# Patient Record
Sex: MALE | Race: WHITE | NOT HISPANIC OR LATINO | Employment: OTHER | URBAN - METROPOLITAN AREA
[De-identification: names, ages, dates, MRNs, and addresses within clinical notes are randomized per-mention and may not be internally consistent; named-entity substitution may affect disease eponyms.]

---

## 2017-12-05 ENCOUNTER — TELEPHONE (OUTPATIENT)
Dept: NEUROSURGERY | Facility: CLINIC | Age: 72
End: 2017-12-05

## 2017-12-05 NOTE — TELEPHONE ENCOUNTER
----- Message from Olivier Cormier sent at 12/5/2017  3:17 PM CST -----  Contact[facility]  Appointment Request From: Erasmo Fitch    With Provider: Georgi Brennan MD [[facility] - Neurosurgery [facility]]    Would Accept With:Only the person I've selected    Preferred Date Range: From 12/5/2017 To 2/28/2018    Preferred Times: Any    Reason for visit: Request an Appt    Comments:  Pain in Neck

## 2017-12-05 NOTE — TELEPHONE ENCOUNTER
Dr Brennan's last note said nothing surgical to offer, but the patient insists on coming to see him and not a Pa. Scheduled appt 02/06/2018. Patient verbalized acknowledegement

## 2017-12-27 ENCOUNTER — OFFICE VISIT (OUTPATIENT)
Dept: NEUROLOGY | Facility: CLINIC | Age: 72
End: 2017-12-27
Payer: MEDICARE

## 2017-12-27 VITALS
DIASTOLIC BLOOD PRESSURE: 82 MMHG | HEIGHT: 70 IN | SYSTOLIC BLOOD PRESSURE: 133 MMHG | BODY MASS INDEX: 25.69 KG/M2 | HEART RATE: 60 BPM | WEIGHT: 179.44 LBS

## 2017-12-27 DIAGNOSIS — M48.9 CERVICAL SPINE DISEASE: ICD-10-CM

## 2017-12-27 DIAGNOSIS — M54.2 NECK PAIN: Primary | ICD-10-CM

## 2017-12-27 DIAGNOSIS — M47.12 CERVICAL SPONDYLOSIS WITH MYELOPATHY: ICD-10-CM

## 2017-12-27 PROCEDURE — 99214 OFFICE O/P EST MOD 30 MIN: CPT | Mod: S$GLB,,, | Performed by: PSYCHIATRY & NEUROLOGY

## 2017-12-27 PROCEDURE — 99999 PR PBB SHADOW E&M-EST. PATIENT-LVL III: CPT | Mod: PBBFAC,,, | Performed by: PSYCHIATRY & NEUROLOGY

## 2017-12-27 RX ORDER — TRIAMTERENE AND HYDROCHLOROTHIAZIDE 37.5; 25 MG/1; MG/1
CAPSULE ORAL
COMMUNITY
Start: 2017-09-19 | End: 2020-06-05

## 2017-12-27 RX ORDER — TRIAMCINOLONE ACETONIDE 1 MG/G
CREAM TOPICAL
COMMUNITY
Start: 2017-12-15 | End: 2019-03-14

## 2017-12-27 NOTE — PROGRESS NOTES
Subjective:       Patient ID: Erasmo Fitch is a 72 y.o. male.    Chief Complaint:  Neck Pain      History of Present Illness  HPI   This is a 72-year-old male who returns in followup.  He has had a history of chronic neck problems and subsequently underwent a cervical fusion done by Dr. Guerra, [facility] neurosurgery, at [facility] earlier this year.  He has subsequently done well though continues to have neck pain with associated muscle tightness radiating to the shoulders and between the shoulder blade.  He denies any weakness.  He also had some back problems with occasional tingling sensation to the hip on the left.  He denies any history of recent trauma.  He does plan to see neurosurgery at [facility] in about a month.  He has been otherwise stable and is presently in pain management for the neck pain.    Review of Systems  Review of Systems   Constitutional: Negative.    HENT: Negative for hearing loss.    Eyes: Negative.  Negative for visual disturbance.   Respiratory: Negative.  Negative for shortness of breath.    Cardiovascular: Negative for chest pain and palpitations.   Gastrointestinal: Negative.    Genitourinary: Negative.    Musculoskeletal: Positive for back pain (occasional back pain related to osteoarthritis and history of recent fall) and neck pain. Negative for gait problem.   Skin: Negative.    Neurological: Negative for tremors, seizures, syncope, speech difficulty, weakness and headaches.   Psychiatric/Behavioral: Negative.        Objective:      Neurologic Exam    Physical Exam   Constitutional: He appears well-developed and well-nourished.   HENT:   Head: Normocephalic and atraumatic.   Right Ear: Hearing normal.   Left Ear: Hearing normal.   Eyes:   Fundus examination shows sharp disc margins.   Neck: Normal range of motion. Neck supple. Muscular tenderness (Mid and upper cervical paraspinal muscle tenderness) present. Carotid bruit is not present.   Neurological: He is alert. He  displays abnormal reflex (DTRs are brisk though diminished at the ankles.  Plantars are flexor). He displays no atrophy. No cranial nerve deficit (Visual fields at bedside testing essentially normal.  No facial asymmetry noted with facial movements and sensory exam being normal/symmetrical.  Corneals/gag reflexes normal.  Tongue & palate movements normal.  Hearing unimpaired.  Shoulder shrug was norm) or sensory deficit. He exhibits normal muscle tone. He displays a negative Romberg sign. Coordination and gait normal.   Mental status examination: Patient is fully oriented and able to give an adequate history.  Recall of recent and past information is good.  Immediate recall is normal.  Attention span and concentration was normal.  Judgment and insight is normal.  Language functions are intact with no evidence of aphasia or dysarthria.  Comprehension is unimpaired.  Affect is appropriate, mood was even.  No thought disorder is noted.   Vitals reviewed.        Assessment:        1. Neck pain    2. Cervical spine disease    3. Cervical spondylosis with myelopathy             Plan:       Discussed with patient and spouse.  He is advised to continue follow-up with pain management and keep his appointment with [facility] neurosurgery.  Would expect his symptoms would gradually improve over time with treatment at the pain management Center.  He will follow-up on an as-needed basis.

## 2017-12-27 NOTE — PATIENT INSTRUCTIONS
Discussed with patient and spouse.  He is advised to continue follow-up with pain management and keep his appointment with [facility] neurosurgery.  Would expect his symptoms would gradually improve over time with treatment at the pain management Center.  He will follow-up on an as-needed basis.

## 2018-01-20 ENCOUNTER — OFFICE VISIT (OUTPATIENT)
Dept: URGENT CARE | Facility: CLINIC | Age: 73
End: 2018-01-20
Payer: MEDICARE

## 2018-01-20 VITALS
OXYGEN SATURATION: 100 % | BODY MASS INDEX: 25.62 KG/M2 | HEIGHT: 70 IN | WEIGHT: 179 LBS | RESPIRATION RATE: 16 BRPM | HEART RATE: 64 BPM | SYSTOLIC BLOOD PRESSURE: 162 MMHG | DIASTOLIC BLOOD PRESSURE: 90 MMHG | TEMPERATURE: 97 F

## 2018-01-20 DIAGNOSIS — N41.0 ACUTE PROSTATITIS: Primary | ICD-10-CM

## 2018-01-20 LAB
BILIRUB UR QL STRIP: NEGATIVE
GLUCOSE UR QL STRIP: NEGATIVE
KETONES UR QL STRIP: POSITIVE
LEUKOCYTE ESTERASE UR QL STRIP: NEGATIVE
PH, POC UA: 5 (ref 5–8)
POC BLOOD, URINE: NEGATIVE
POC NITRATES, URINE: NEGATIVE
PROT UR QL STRIP: NEGATIVE
SP GR UR STRIP: 1.02 (ref 1–1.03)
UROBILINOGEN UR STRIP-ACNC: NORMAL (ref 0.3–2.2)

## 2018-01-20 PROCEDURE — 81003 URINALYSIS AUTO W/O SCOPE: CPT | Mod: QW,S$GLB,, | Performed by: NURSE PRACTITIONER

## 2018-01-20 PROCEDURE — 99203 OFFICE O/P NEW LOW 30 MIN: CPT | Mod: 25,S$GLB,, | Performed by: NURSE PRACTITIONER

## 2018-01-20 RX ORDER — SULFAMETHOXAZOLE AND TRIMETHOPRIM 800; 160 MG/1; MG/1
1 TABLET ORAL 2 TIMES DAILY
Qty: 28 TABLET | Refills: 0 | Status: SHIPPED | OUTPATIENT
Start: 2018-01-20 | End: 2018-02-03

## 2018-01-20 NOTE — PROGRESS NOTES
"Subjective:       Patient ID: Erasmo Fitch is a 72 y.o. male.    Vitals:  height is 5' 10" (1.778 m) and weight is 81.2 kg (179 lb). His temperature is 97.2 °F (36.2 °C). His blood pressure is 162/90 (abnormal) and his pulse is 64. His respiration is 16 and oxygen saturation is 100%.     Chief Complaint: Urinary Tract Infection    Pt states he has been having lower back aching and smelly urine. Pt states the symptoms started 2 days ago. Pt has history of uti's and a partial TURP about 3 years ago.  Pt reports urinary hesitancy, frequency, and concentrated urine.      Urinary Tract Infection    This is a new problem. The current episode started in the past 7 days. The problem has been unchanged. The patient is experiencing no pain. There has been no fever. Pertinent negatives include no chills, hematuria, nausea, urgency, vomiting or rash. He has tried nothing for the symptoms. His past medical history is significant for recurrent UTIs.     Review of Systems   Constitution: Negative for chills and fever.   Eyes: Negative for discharge.   Skin: Negative for flushing and rash.   Musculoskeletal: Negative for back pain.   Gastrointestinal: Negative for nausea and vomiting.   Genitourinary: Negative for dysuria, genital sores, hematuria and urgency.       Objective:      Physical Exam   Constitutional: He is oriented to person, place, and time. Vital signs are normal. He appears well-developed and well-nourished. He is cooperative.  Non-toxic appearance. He does not have a sickly appearance. He does not appear ill. No distress.   HENT:   Head: Normocephalic and atraumatic.   Right Ear: External ear normal.   Left Ear: External ear normal.   Nose: Nose normal.   Eyes: Conjunctivae and lids are normal.   Cardiovascular: Normal rate, regular rhythm and normal heart sounds.    Pulmonary/Chest: Effort normal and breath sounds normal. No respiratory distress. He has no decreased breath sounds. He has no wheezes. He has no " rhonchi. He has no rales. He exhibits no tenderness.   Abdominal: Normal appearance and bowel sounds are normal. He exhibits no distension, no pulsatile liver, no fluid wave, no ascites, no pulsatile midline mass and no mass. There is no hepatosplenomegaly, splenomegaly or hepatomegaly. There is no tenderness. There is no rigidity, no rebound, no guarding, no CVA tenderness, no tenderness at McBurney's point and negative Pimentel's sign.   Musculoskeletal:        Back:    Pt reports aching in lower back bilaterally.   Neurological: He is alert and oriented to person, place, and time.   Skin: Skin is warm. He is not diaphoretic.   Psychiatric: He has a normal mood and affect. His behavior is normal.   Nursing note and vitals reviewed.      Assessment:       1. Acute prostatitis        Plan:         Acute prostatitis  -     POCT Urinalysis, Dipstick, Automated, W/O Scope  -     sulfamethoxazole-trimethoprim 800-160mg (BACTRIM DS) 800-160 mg Tab; Take 1 tablet by mouth 2 (two) times daily.  Dispense: 28 tablet; Refill: 0  -     Urine culture    urine culture came back positive for enterococcus faecalis.  Not sensitive to bactrim.  Will discontinue bactrim ds and start augmentin 875mg bid.  Zeferino Johnson Md.  Pt will follow up with urologist.  Pt has an established urologist, however he has been unable to make an appointment due to offices being closed from weather conditions.  Instructed pt to follow up with his urologist as soon as possible.  Discussed urine culture with pt and that clinic will give him a call in the next week with results.

## 2018-01-20 NOTE — PATIENT INSTRUCTIONS
Please follow up with your urologist in the next 1-2 weeks.    Please go to the Emergency Department for any concerns or worsening of condition or if you are unable to urinate.    If you were prescribed antibiotics, please take them to completion.    Please follow up with your primary care doctor or specialist as needed.    Please drink plenty of fluids.  Please get plenty of rest.    Please follow up with your primary care doctor or specialist as needed.    If you  smoke, please stop smoking.    Prostatitis    The prostate gland is located deep inside the body at the base of the bladder. Prostatitis is an inflammation of the prostate gland. This can occur with or without infection. Most cases of prostatitis are long term (chronic). Most do not involve a bacterial infection.  · Chronic prostatitis is more common in older men. It is usually an inflammatory condition and not an infection. But, bacterial infection can also cause chronic prostatitis. It can cause pain in the rectum, urethra, bladder, or scrotum. It can also make you unable to fully empty the bladder.  You may urinate often, or have burning with urination. Prostatitis may also cause painful ejaculation and erectile dysfunction.  · Sudden onset (acute) prostatitis usually occurs in men younger than 35. It is from a bacterial infection. You may have severe symptoms such as fever, chills, muscle aches, and pain in the area between the scrotum and anus (perineum). You may have a hard time urinating, or have pain or burning when urinating. There may be blood or pus in the urine.  Your healthcare provider may do a culture test on prostate fluids or discharge from the penis. This will help determine if bacteria are the cause. Treatment can include antibiotics, anti-inflammatory medicine, prostate medicines, and stool softeners.  Home care  These guidelines will help you care for yourself at home:  · Rest at home until the fever is gone and you are feeling  better.  · A hot sitz bath may offer some relief. Fill a tub with 6 inches of hot water. Allow the water to run so you can keep it hot for 10 to 15 minutes.  · Drink plenty of fluids. Do not drink alcohol or caffeine until all symptoms are gone.  · If your healthcare gives you an antibiotic, take it exactly as you are told. Take it until it is all gone.  · Constipation causes straining and pain. Avoid constipation by eating natural laxatives such as prunes, fresh fruits, and whole-grain cereals. If needed, use a mild over-the-counter (OTC) laxative for constipation. An OTC stool softener may be used to keep the stools soft.  · If sex is uncomfortable or painful, avoid until symptoms get better.  · You may use OTC medicines for pain and fever, unless another medicine was given. If you have chronic liver or kidney disease, talk with your healthcare provider before using these medicines. Also talk with your provider if you've ever had a stomach ulcer or GI bleeding.  Follow-up care  Follow up with your healthcare provider, a urologist, or as advised to be sure you are responding to treatment. Your healthcare provider may want to see you after you finish your antibiotics to be sure the infection has cleared. If a culture was taken, you may call for the results as directed. A culture test can help your healthcare provider know if you are on the correct antibiotic.  Call 911  Call 911 if any of these occur:  · Weakness, dizziness, or fainting  When to seek medical advice  Call your healthcare provider right away if any of these occur:  · Fever of 100.4°F (38°C) or higher after 3 days of treatment, or as advised  · Unable to pass urine for 8 hours  · Pressure or pain in your bladder gets worse  · Painful swelling of the testicle or scrotum  Date Last Reviewed: 10/1/2016  © 8803-4833 [employer]. [address] [place] [address], [place] [address]. All rights reserved. This information is not intended as a substitute  for professional medical care. Always follow your healthcare professional's instructions.

## 2018-01-26 LAB
BACTERIA UR CULT: ABNORMAL
BACTERIA UR CULT: ABNORMAL
OTHER ANTIBIOTIC SUSC ISLT: ABNORMAL

## 2018-01-26 RX ORDER — AMOXICILLIN AND CLAVULANATE POTASSIUM 875; 125 MG/1; MG/1
1 TABLET, FILM COATED ORAL 2 TIMES DAILY
Qty: 20 TABLET | Refills: 0 | Status: SHIPPED | OUTPATIENT
Start: 2018-01-26 | End: 2018-02-05

## 2018-02-06 ENCOUNTER — OFFICE VISIT (OUTPATIENT)
Dept: NEUROSURGERY | Facility: CLINIC | Age: 73
End: 2018-02-06
Payer: MEDICARE

## 2018-02-06 VITALS
HEART RATE: 60 BPM | HEIGHT: 70 IN | WEIGHT: 179 LBS | DIASTOLIC BLOOD PRESSURE: 85 MMHG | SYSTOLIC BLOOD PRESSURE: 133 MMHG | TEMPERATURE: 98 F | BODY MASS INDEX: 25.62 KG/M2

## 2018-02-06 DIAGNOSIS — Z98.1 S/P CERVICAL SPINAL FUSION: Primary | ICD-10-CM

## 2018-02-06 PROCEDURE — 99999 PR PBB SHADOW E&M-EST. PATIENT-LVL III: CPT | Mod: PBBFAC,,, | Performed by: NEUROLOGICAL SURGERY

## 2018-02-06 PROCEDURE — 1159F MED LIST DOCD IN RCRD: CPT | Mod: S$GLB,,, | Performed by: NEUROLOGICAL SURGERY

## 2018-02-06 PROCEDURE — 99214 OFFICE O/P EST MOD 30 MIN: CPT | Mod: S$GLB,,, | Performed by: NEUROLOGICAL SURGERY

## 2018-02-06 PROCEDURE — 1126F AMNT PAIN NOTED NONE PRSNT: CPT | Mod: S$GLB,,, | Performed by: NEUROLOGICAL SURGERY

## 2018-02-06 PROCEDURE — 3008F BODY MASS INDEX DOCD: CPT | Mod: S$GLB,,, | Performed by: NEUROLOGICAL SURGERY

## 2018-02-06 NOTE — PROGRESS NOTES
Subjective:    I, Candace Moody, attest that this documentation has been prepared under the direction and in the presence of TORSTEN Brennan MD.     Patient ID: Erasmo Fitch is a 72 y.o. male.    Chief Complaint: Follow-up    HPI   Pt is a 72 y.o. male who presents with history of C4-5, C5-6, C6-7 fusion. Currently seeing Dr. Thomas. Now back to see us. Pt has not had any new scans since 2015. Pt states that he has had a C4-7 fusion 10/5/2016 by Dr. Lundberg at [facility]. Pt has not been followed by Dr. Lundberg. Postoperatively pt has had significant relief with increased mobility. Pt presents today to assess the fusion. Pt has received bone stimulator.     Review of Systems   Constitutional: Negative for chills, diaphoresis, fatigue and fever.   HENT: Negative for congestion, rhinorrhea, sinus pressure, sneezing, sore throat and trouble swallowing.    Eyes: Negative.  Negative for visual disturbance.   Respiratory: Negative for cough, choking, chest tightness and shortness of breath.    Cardiovascular: Negative for chest pain.   Gastrointestinal: Negative for abdominal pain, diarrhea, nausea and vomiting.   Endocrine: Negative.    Genitourinary: Negative for dysuria.   Skin: Negative for color change, pallor, rash and wound.   Neurological: Negative for syncope.   Hematological: Does not bruise/bleed easily.   Psychiatric/Behavioral: Negative for confusion.       Objective:      Physical Exam:  Nursing note and vitals reviewed.    Constitutional: He appears well-developed.     Eyes: Pupils are equal, round, and reactive to light. Conjunctivae and EOM are normal.     Cardiovascular: Normal rate, regular rhythm, normal pulses and intact distal pulses.     Abdominal: Soft.     Psych/Behavior: He is alert. He is oriented to person, place, and time. He has a normal mood and affect.     Musculoskeletal: Gait is normal.        Neck: Range of motion is full. There is no tenderness. Muscle strength is 5/5. Tone is normal.        Back:  Range of motion is full. There is no tenderness. Muscle strength is 5/5. Tone is normal.        Right Upper Extremities: Range of motion is full. There is no tenderness. Muscle strength is 5/5. Tone is normal.        Left Upper Extremities: Range of motion is full. There is no tenderness. Muscle strength is 5/5. Tone is normal.       Right Lower Extremities: Range of motion is full. There is no tenderness. Muscle strength is 5/5. Tone is normal.        Left Lower Extremities: Range of motion is full. There is no tenderness. Muscle strength is 5/5. Tone is normal.     Neurological:        Coordination: He has a normal Romberg Test, normal finger to nose coordination, normal heel to shin coordination and normal tandem walking coordination.        DTRs: DTRs are normal. Tricep reflexes are 2+ on the right side and 2+ on the left side. Bicep reflexes are 2+ on the right side and 2+ on the left side. Brachioradialis reflexes are 2+ on the right side and 2+ on the left side. Patellar reflexes are 2+ on the right side and 2+ on the left side. Achilles reflexes are 2+ on the right side and 2+ on the left side.        Cranial nerves: Cranial nerve(s) II, III, IV, V, VI, VII, VIII, IX, X, XI and XII are intact.        Pt's wound is well healed and full strength and no signs of radiculopathy or myelopathy.    Imaging:   MRI C spine, dated 3/22/3017, shows multiple level disc disease at C4-7     Latest CT of c-spine shows his ACDF construct looks good and he is fused solid at C4-C5 and C6-C7 and C5-6 is starting to fuse    ITORSTEN MD, personally reviewed the imaging and interpreted independent of the radiology report.    Assessment/Plan:   Pt with with 3 level ACDF done at [facility].  Still has neck pain but radiculopathy better. His imaging shows that he is fused and there is nothing else I have to offer from a neurosurgical prospective. Spent a fair amount of time going over his scans and explaining things to him and his  wife.  F/U PRN    .     I, TORSETN Brennan MD, personally performed the services described in this documentation. All medical record entries made by the scribe, Candace Moody, were at my direction and in my presence.  I have reviewed the chart and agree that the record reflects my personal performance and is accurate and complete.

## 2018-02-14 ENCOUNTER — OFFICE VISIT (OUTPATIENT)
Dept: URGENT CARE | Facility: CLINIC | Age: 73
End: 2018-02-14
Payer: MEDICARE

## 2018-02-14 VITALS
BODY MASS INDEX: 25.05 KG/M2 | WEIGHT: 175 LBS | HEART RATE: 58 BPM | TEMPERATURE: 97 F | SYSTOLIC BLOOD PRESSURE: 142 MMHG | OXYGEN SATURATION: 98 % | HEIGHT: 70 IN | RESPIRATION RATE: 18 BRPM | DIASTOLIC BLOOD PRESSURE: 91 MMHG

## 2018-02-14 DIAGNOSIS — M54.9 BACK PAIN, UNSPECIFIED BACK LOCATION, UNSPECIFIED BACK PAIN LATERALITY, UNSPECIFIED CHRONICITY: Primary | ICD-10-CM

## 2018-02-14 LAB
BILIRUB UR QL STRIP: NEGATIVE
GLUCOSE UR QL STRIP: NEGATIVE
KETONES UR QL STRIP: POSITIVE
LEUKOCYTE ESTERASE UR QL STRIP: NEGATIVE
PH, POC UA: 5.5 (ref 5–8)
POC BLOOD, URINE: NEGATIVE
POC NITRATES, URINE: NEGATIVE
PROT UR QL STRIP: NEGATIVE
SP GR UR STRIP: 1.02 (ref 1–1.03)
UROBILINOGEN UR STRIP-ACNC: ABNORMAL (ref 0.3–2.2)

## 2018-02-14 PROCEDURE — 1159F MED LIST DOCD IN RCRD: CPT | Mod: S$GLB,,, | Performed by: NURSE PRACTITIONER

## 2018-02-14 PROCEDURE — 81003 URINALYSIS AUTO W/O SCOPE: CPT | Mod: QW,S$GLB,, | Performed by: NURSE PRACTITIONER

## 2018-02-14 PROCEDURE — 99213 OFFICE O/P EST LOW 20 MIN: CPT | Mod: 25,S$GLB,, | Performed by: NURSE PRACTITIONER

## 2018-02-14 PROCEDURE — 3008F BODY MASS INDEX DOCD: CPT | Mod: S$GLB,,, | Performed by: NURSE PRACTITIONER

## 2018-02-14 NOTE — PROGRESS NOTES
"Subjective:       Patient ID: Erasmo Fitch is a 72 y.o. male.    Vitals:  height is 5' 10" (1.778 m) and weight is 79.4 kg (175 lb). His temperature is 97.1 °F (36.2 °C). His blood pressure is 142/91 (abnormal) and his pulse is 58 (abnormal). His respiration is 18 and oxygen saturation is 98%.     Chief Complaint: Urinary Tract Infection (Follow up of Prostatitis that was given medication for on the 1-)    Pt seen here last month for UTI and was treated with bactrim and augmentin once culture came back; he said he is here today just to make sure he no longer has "bacteria in my urine"; states he does have slight lower right back pain      Urinary Tract Infection    This is a recurrent problem. The current episode started 1 to 4 weeks ago. The problem has been gradually improving. Pertinent negatives include no chills, hematuria, nausea, urgency, vomiting or rash. The treatment provided significant relief.     Review of Systems   Constitution: Negative for chills and fever.   Eyes: Negative for discharge.   Skin: Negative for flushing and rash.   Musculoskeletal: Negative for back pain.   Gastrointestinal: Negative for nausea and vomiting.   Genitourinary: Negative for dysuria, genital sores, hematuria and urgency.       Objective:      Physical Exam   Constitutional: He is oriented to person, place, and time. He appears well-developed and well-nourished. He is cooperative.  Non-toxic appearance. He does not appear ill. No distress.   HENT:   Head: Normocephalic and atraumatic.   Right Ear: Hearing, tympanic membrane and ear canal normal.   Left Ear: Hearing, tympanic membrane and ear canal normal.   Nose: No mucosal edema, rhinorrhea or nasal deformity. No epistaxis. Right sinus exhibits no maxillary sinus tenderness and no frontal sinus tenderness. Left sinus exhibits no maxillary sinus tenderness and no frontal sinus tenderness.   Mouth/Throat: Uvula is midline and mucous membranes are normal. No trismus " "in the jaw. Normal dentition. No uvula swelling. No posterior oropharyngeal erythema.   Eyes: Conjunctivae and lids are normal. Right eye exhibits no discharge. Left eye exhibits no discharge. No scleral icterus.   Sclera clear bilat   Neck: Trachea normal, normal range of motion, full passive range of motion without pain and phonation normal. Neck supple.   Cardiovascular: Normal rate, regular rhythm and normal pulses.    Pulmonary/Chest: Effort normal. No respiratory distress.   Abdominal: Soft. Normal appearance. He exhibits no distension, no pulsatile midline mass and no mass. There is no tenderness.   Musculoskeletal: Normal range of motion. He exhibits no edema or deformity.   Neurological: He is alert and oriented to person, place, and time. He exhibits normal muscle tone. Coordination normal.   Skin: Skin is warm, dry and intact. He is not diaphoretic. No pallor.   Psychiatric: He has a normal mood and affect. His speech is normal and behavior is normal. Judgment and thought content normal. Cognition and memory are normal.   Nursing note and vitals reviewed.        Urine negative today for leukocytes however pt requesting urine culture "just to be sure"  Assessment:       1. Back pain, unspecified back location, unspecified back pain laterality, unspecified chronicity        Plan:       Patient Instructions     Urinary Tract Infections in Men    Urinary tract infections (UTIs) are most often caused by bacteria that invade the urinary tract. The bacteria may come from outside the body. Or they may travel from the skin outside of rectum into the urethra. Pain in or around the urinary tract is a common symptom for most UTIs. But the only way to know for sure if you have a UTI is to have a urinalysis and urine culture.   Types of UTIs  · Cystitis: This is a bladder infection and is often linked to a blockage from an enlarged prostate. You may have an urgent or frequent need to urinate, and bloody urine. " Treatment includes antibiotics and medicine to relax or shrink the prostate. Sometimes, surgery is needed.  · Urethritis: This is an infection of the urethra. You may have a discharge from the urethra or burning when you urinate. You may also have pain in the urethra or penis. It is treated with antibiotics.  · Prostatitis: This is an inflammation or infection of the prostate. You may have an urgent or frequent need to urinate, fever, or burning when you urinate. Or you may have a tender prostate, or a vague feeling of pressure. Prostatitis is treated with a range of medicines, depending on the cause.  · Pyelonephritis: This is a kidney infection. If not treated, it can be serious and damage your kidneys. In severe cases you may be hospitalized. You may have a fever and upper back pain.  Treating a UTI  · Medicine: Most UTIs are treated with antibiotics. These kill the bacteria. The length of time you need to take them depends on the type of infection. Take antibiotics exactly as directed until all of the medicine is gone. If you don't, the infection may not go away and may become harder to treat. For certain types of UTIs, you may be given other medicine to help treat your symptoms.  · Lifestyle changes: The lifestyle changes below will help get rid of your current infection. They may also help prevent future UTIs.  ¨ Drink plenty of fluids such as water, juice, or other caffeine-free drinks. This helps flush bacteria out of your system.  ¨ Empty your bladder when you feel the urge to urinate and before going to sleep. Urine that stays in your bladder promotes infection.  ¨ Use condoms during sex. These help prevent UTIs caused by sexually transmitted bacteria.  ¨ Keep follow-up appointments with your healthcare provider. He or she can may do tests to make sure the infection has cleared. If needed, more treatment can be started.  · Other treatment: Most UTIs respond to medicine. But sometimes a procedure or surgery  is needed. This can treat an enlarged prostate, or remove a kidney stone or other blockage. Surgery may also treat problems caused by scarring or long-term infections.  Date Last Reviewed: 1/1/2017  © 4471-7171 [employer]. [address] [place] [address], [place] [address]. All rights reserved. This information is not intended as a substitute for professional medical care. Always follow your healthcare professional's instructions.              Back pain, unspecified back location, unspecified back pain laterality, unspecified chronicity  -     POCT Urinalysis, Dipstick, Automated, W/O Scope  -     Urine culture

## 2018-02-14 NOTE — PATIENT INSTRUCTIONS
Urinary Tract Infections in Men    Urinary tract infections (UTIs) are most often caused by bacteria that invade the urinary tract. The bacteria may come from outside the body. Or they may travel from the skin outside of rectum into the urethra. Pain in or around the urinary tract is a common symptom for most UTIs. But the only way to know for sure if you have a UTI is to have a urinalysis and urine culture.   Types of UTIs  · Cystitis: This is a bladder infection and is often linked to a blockage from an enlarged prostate. You may have an urgent or frequent need to urinate, and bloody urine. Treatment includes antibiotics and medicine to relax or shrink the prostate. Sometimes, surgery is needed.  · Urethritis: This is an infection of the urethra. You may have a discharge from the urethra or burning when you urinate. You may also have pain in the urethra or penis. It is treated with antibiotics.  · Prostatitis: This is an inflammation or infection of the prostate. You may have an urgent or frequent need to urinate, fever, or burning when you urinate. Or you may have a tender prostate, or a vague feeling of pressure. Prostatitis is treated with a range of medicines, depending on the cause.  · Pyelonephritis: This is a kidney infection. If not treated, it can be serious and damage your kidneys. In severe cases you may be hospitalized. You may have a fever and upper back pain.  Treating a UTI  · Medicine: Most UTIs are treated with antibiotics. These kill the bacteria. The length of time you need to take them depends on the type of infection. Take antibiotics exactly as directed until all of the medicine is gone. If you don't, the infection may not go away and may become harder to treat. For certain types of UTIs, you may be given other medicine to help treat your symptoms.  · Lifestyle changes: The lifestyle changes below will help get rid of your current infection. They may also help prevent future UTIs.  ¨ Drink  plenty of fluids such as water, juice, or other caffeine-free drinks. This helps flush bacteria out of your system.  ¨ Empty your bladder when you feel the urge to urinate and before going to sleep. Urine that stays in your bladder promotes infection.  ¨ Use condoms during sex. These help prevent UTIs caused by sexually transmitted bacteria.  ¨ Keep follow-up appointments with your healthcare provider. He or she can may do tests to make sure the infection has cleared. If needed, more treatment can be started.  · Other treatment: Most UTIs respond to medicine. But sometimes a procedure or surgery is needed. This can treat an enlarged prostate, or remove a kidney stone or other blockage. Surgery may also treat problems caused by scarring or long-term infections.  Date Last Reviewed: 1/1/2017  © 7144-2714 [employer]. [address] [place] [address], [place] [address]. All rights reserved. This information is not intended as a substitute for professional medical care. Always follow your healthcare professional's instructions.

## 2018-02-16 LAB
BACTERIA UR CULT: NO GROWTH
BACTERIA UR CULT: NORMAL

## 2018-03-05 ENCOUNTER — OFFICE VISIT (OUTPATIENT)
Dept: URGENT CARE | Facility: CLINIC | Age: 73
End: 2018-03-05
Payer: MEDICARE

## 2018-03-05 VITALS
BODY MASS INDEX: 25.05 KG/M2 | HEIGHT: 70 IN | RESPIRATION RATE: 19 BRPM | DIASTOLIC BLOOD PRESSURE: 93 MMHG | OXYGEN SATURATION: 99 % | WEIGHT: 175 LBS | TEMPERATURE: 97 F | HEART RATE: 71 BPM | SYSTOLIC BLOOD PRESSURE: 163 MMHG

## 2018-03-05 DIAGNOSIS — R30.0 DYSURIA: Primary | ICD-10-CM

## 2018-03-05 DIAGNOSIS — R35.0 URINARY FREQUENCY: ICD-10-CM

## 2018-03-05 LAB
BILIRUB UR QL STRIP: NEGATIVE
GLUCOSE UR QL STRIP: NEGATIVE
KETONES UR QL STRIP: POSITIVE
LEUKOCYTE ESTERASE UR QL STRIP: NEGATIVE
PH, POC UA: 5.5 (ref 5–8)
POC BLOOD, URINE: NEGATIVE
POC NITRATES, URINE: NEGATIVE
PROT UR QL STRIP: POSITIVE
SP GR UR STRIP: 1.02 (ref 1–1.03)
UROBILINOGEN UR STRIP-ACNC: ABNORMAL (ref 0.3–2.2)

## 2018-03-05 PROCEDURE — 81003 URINALYSIS AUTO W/O SCOPE: CPT | Mod: QW,S$GLB,, | Performed by: FAMILY MEDICINE

## 2018-03-05 PROCEDURE — 96372 THER/PROPH/DIAG INJ SC/IM: CPT | Mod: S$GLB,,, | Performed by: FAMILY MEDICINE

## 2018-03-05 PROCEDURE — 99214 OFFICE O/P EST MOD 30 MIN: CPT | Mod: 25,S$GLB,, | Performed by: FAMILY MEDICINE

## 2018-03-05 PROCEDURE — 3077F SYST BP >= 140 MM HG: CPT | Mod: S$GLB,,, | Performed by: FAMILY MEDICINE

## 2018-03-05 PROCEDURE — 3080F DIAST BP >= 90 MM HG: CPT | Mod: S$GLB,,, | Performed by: FAMILY MEDICINE

## 2018-03-05 RX ORDER — NITROFURANTOIN 25; 75 MG/1; MG/1
100 CAPSULE ORAL 2 TIMES DAILY
Qty: 28 CAPSULE | Refills: 0 | Status: SHIPPED | OUTPATIENT
Start: 2018-03-05 | End: 2018-03-19

## 2018-03-05 RX ORDER — CEFTRIAXONE 1 G/1
1 INJECTION, POWDER, FOR SOLUTION INTRAMUSCULAR; INTRAVENOUS
Status: COMPLETED | OUTPATIENT
Start: 2018-03-05 | End: 2018-03-05

## 2018-03-05 RX ADMIN — CEFTRIAXONE 1 G: 1 INJECTION, POWDER, FOR SOLUTION INTRAMUSCULAR; INTRAVENOUS at 11:03

## 2018-03-05 NOTE — PATIENT INSTRUCTIONS
"  Dysuria     Painful urination (dysuria) is often caused by a problem in the urinary tract.   Dysuria is pain felt during urination. It is often described as a burning. Learn more about this problem and how it can be treated.  What causes dysuria?  Possible causes include:  · Infection with a bacteria or virus such as a urinary tract infection (UTI or a sexually transmitted infection (STI)  · Sensitivity or allergy to chemicals such as those found in lotions and other products  · Prostate or bladder problems  · Radiation therapy to the pelvic area  How is dysuria diagnosed?  Your healthcare provider will examine you. He or she will ask about your symptoms and health. After talking with you and doing a physical exam, your healthcare provider may know what is causing your dysuria. He or she will usually request  a sample of your urine. Tests of your urine, or a "urinalysis," are done. A urinalysis may include:  · Looking at the urine sample (visual exam)  · Checking for substances (chemical exam)  · Looking at a small amount under a microscope (microscopic exam)  Some parts of the urinalysis may be done in the provider's office and some in a lab. And, the urine sample may be checked for bacteria and yeast (urine culture). Your healthcare provider will tell you more about these tests if they are needed.  How is dysuria treated?  Treatment depends on the cause. If you have a bacterial infection, you may need antibiotics. You may be given medicines to make it easier for you to urinate and help relieve pain. Your healthcare provider can tell you more about your treatment options. Untreated, symptoms may get worse.  When to call your healthcare provider  Call the healthcare provider right away if you have any of the following:  · Fever of 100.4°F (38°C) or higher   · No improvement after three days of treatment  · Trouble urinating because of pain  · New or increased discharge from the vagina or penis  · Rash or joint " pain  · Increased back or abdominal pain  · Enlarged painful lymph nodes (lumps) in the groin   Date Last Reviewed: 1/1/2017  © 6964-2290 [employer], "[employer]". [address] [place] [address], [place] [address]. All rights reserved. This information is not intended as a substitute for professional medical care. Always follow your healthcare professional's instructions.      Most likely prostatitis.  follow up with urologist for further evaluation and treatment.    Return to the urgent care or go to the ER if symptoms get worse.    Zeferino Johnson MD

## 2018-03-05 NOTE — PROGRESS NOTES
"Subjective:       Patient ID: Erasmo Fitch is a 72 y.o. male.    Vitals:  height is 5' 10" (1.778 m) and weight is 79.4 kg (175 lb). His oral temperature is 96.8 °F (36 °C). His blood pressure is 163/93 (abnormal) and his pulse is 71. His respiration is 19 and oxygen saturation is 99%.     Chief Complaint: Urinary Tract Infection    Urinary Tract Infection    This is a new problem. The current episode started 1 to 4 weeks ago (2weeks). The problem occurs every urination. The problem has been unchanged. The quality of the pain is described as aching and burning. The pain is at a severity of 7/10. The pain is moderate. There has been no fever. The fever has been present for less than 1 day. He is not sexually active. There is no history of pyelonephritis. Associated symptoms include urgency. Pertinent negatives include no chills, hematuria, nausea, vomiting or rash. He has tried acetaminophen for the symptoms. The treatment provided no relief.     Review of Systems   Constitution: Negative for chills and fever.   Eyes: Negative for discharge.   Skin: Negative for flushing and rash.   Musculoskeletal: Positive for back pain.   Gastrointestinal: Negative for nausea and vomiting.   Genitourinary: Positive for dysuria and urgency. Negative for genital sores and hematuria.       Objective:      Physical Exam   Constitutional: He is oriented to person, place, and time. He appears well-developed and well-nourished.   HENT:   Head: Normocephalic and atraumatic.   Eyes: EOM are normal. Pupils are equal, round, and reactive to light.   Neck: Normal range of motion. Neck supple. No JVD present. No tracheal deviation present. No thyromegaly present.   Cardiovascular: Normal rate, regular rhythm and normal heart sounds.  Exam reveals no gallop and no friction rub.    No murmur heard.  Pulmonary/Chest: Breath sounds normal. No respiratory distress. He has no wheezes. He has no rales. He exhibits no tenderness.   Abdominal: Soft. " Bowel sounds are normal. He exhibits no distension and no mass. There is no tenderness. There is no rebound and no guarding. No hernia.   Genitourinary:   Genitourinary Comments: Mild bladder tenderness, no cva tenderness.   Musculoskeletal: Normal range of motion. He exhibits no edema, tenderness or deformity.   Lymphadenopathy:     He has no cervical adenopathy.   Neurological: He is alert and oriented to person, place, and time. He displays normal reflexes. No cranial nerve deficit. He exhibits normal muscle tone. Coordination normal.   Skin: Skin is warm. Capillary refill takes less than 2 seconds. No rash noted. No erythema. No pallor.   Psychiatric: He has a normal mood and affect. His behavior is normal. Judgment and thought content normal.   Vitals reviewed.      Assessment:       1. Dysuria    2. Urinary frequency        Plan:         Dysuria  -     POCT Urinalysis, Dipstick, Automated, W/O Scope  -     cefTRIAXone injection 1 g; Inject 1 g into the muscle one time.  -     nitrofurantoin, macrocrystal-monohydrate, (MACROBID) 100 MG capsule; Take 1 capsule (100 mg total) by mouth 2 (two) times daily.  Dispense: 28 capsule; Refill: 0  -     Urine culture    Urinary frequency      Most likely prostatitis.  follow up with urologist for further evaluation and treatment  Return to the urgent care or go to the ER if symptoms get worse.    Zeferino Johnson MD

## 2018-03-07 LAB
BACTERIA UR CULT: NO GROWTH
BACTERIA UR CULT: NORMAL

## 2018-03-16 ENCOUNTER — TELEPHONE (OUTPATIENT)
Dept: NEUROSURGERY | Facility: CLINIC | Age: 73
End: 2018-03-16

## 2018-03-19 ENCOUNTER — OFFICE VISIT (OUTPATIENT)
Dept: URGENT CARE | Facility: CLINIC | Age: 73
End: 2018-03-19
Payer: MEDICARE

## 2018-03-19 VITALS
HEIGHT: 71 IN | TEMPERATURE: 97 F | DIASTOLIC BLOOD PRESSURE: 81 MMHG | WEIGHT: 160 LBS | OXYGEN SATURATION: 100 % | HEART RATE: 63 BPM | SYSTOLIC BLOOD PRESSURE: 140 MMHG | RESPIRATION RATE: 18 BRPM | BODY MASS INDEX: 22.4 KG/M2

## 2018-03-19 DIAGNOSIS — J02.9 VIRAL PHARYNGITIS: Primary | ICD-10-CM

## 2018-03-19 PROCEDURE — 3077F SYST BP >= 140 MM HG: CPT | Mod: CPTII,S$GLB,, | Performed by: FAMILY MEDICINE

## 2018-03-19 PROCEDURE — 3079F DIAST BP 80-89 MM HG: CPT | Mod: CPTII,S$GLB,, | Performed by: FAMILY MEDICINE

## 2018-03-19 PROCEDURE — 96372 THER/PROPH/DIAG INJ SC/IM: CPT | Mod: S$GLB,,, | Performed by: EMERGENCY MEDICINE

## 2018-03-19 PROCEDURE — 99214 OFFICE O/P EST MOD 30 MIN: CPT | Mod: 25,S$GLB,, | Performed by: FAMILY MEDICINE

## 2018-03-19 RX ORDER — ASPIRIN 81 MG/1
81 TABLET ORAL DAILY
COMMUNITY
End: 2019-08-27

## 2018-03-19 RX ORDER — CEPHALEXIN 500 MG/1
CAPSULE ORAL
COMMUNITY
Start: 2018-03-09 | End: 2019-03-14

## 2018-03-19 RX ORDER — BETAMETHASONE SODIUM PHOSPHATE AND BETAMETHASONE ACETATE 3; 3 MG/ML; MG/ML
6 INJECTION, SUSPENSION INTRA-ARTICULAR; INTRALESIONAL; INTRAMUSCULAR; SOFT TISSUE
Status: COMPLETED | OUTPATIENT
Start: 2018-03-19 | End: 2018-03-19

## 2018-03-19 RX ADMIN — BETAMETHASONE SODIUM PHOSPHATE AND BETAMETHASONE ACETATE 6 MG: 3; 3 INJECTION, SUSPENSION INTRA-ARTICULAR; INTRALESIONAL; INTRAMUSCULAR; SOFT TISSUE at 09:03

## 2018-03-19 NOTE — PROGRESS NOTES
"Subjective:       Patient ID: Erasmo Fitch is a 72 y.o. male.    Vitals:  height is 5' 10.5" (1.791 m) and weight is 72.6 kg (160 lb). His temperature is 97 °F (36.1 °C). His blood pressure is 140/81 (abnormal) and his pulse is 63. His respiration is 18 and oxygen saturation is 100%.     Chief Complaint: Sore Throat    Pt states that he just finished macrobid antibiotics for UTI and keflex given to him by ENT for pharyngitis.   Not taking any OTC meds, worsened in the past 3 days after he worked outside.       Sore Throat    This is a new problem. The current episode started 1 to 4 weeks ago. The problem has been waxing and waning. Neither side of throat is experiencing more pain than the other. There has been no fever. Associated symptoms include swollen glands and trouble swallowing. Pertinent negatives include no abdominal pain, congestion, coughing, ear pain, headaches, hoarse voice or shortness of breath. He has tried NSAIDs for the symptoms. The treatment provided mild relief.     Review of Systems   Constitution: Positive for chills. Negative for fever and malaise/fatigue.   HENT: Positive for sore throat and trouble swallowing. Negative for congestion, ear pain and hoarse voice.    Eyes: Negative for discharge and redness.   Cardiovascular: Negative for chest pain, dyspnea on exertion and leg swelling.   Respiratory: Negative for cough, shortness of breath, sputum production and wheezing.    Musculoskeletal: Negative for myalgias.   Gastrointestinal: Negative for abdominal pain and nausea.   Neurological: Negative for headaches.       Objective:      Physical Exam   Constitutional: He is oriented to person, place, and time. He appears well-developed and well-nourished. He is cooperative.  Non-toxic appearance. He does not appear ill. No distress.   HENT:   Head: Normocephalic and atraumatic.   Right Ear: Hearing, tympanic membrane, external ear and ear canal normal.   Left Ear: Hearing, tympanic membrane, " external ear and ear canal normal.   Nose: Rhinorrhea present. No mucosal edema or nasal deformity. No epistaxis. Right sinus exhibits no maxillary sinus tenderness and no frontal sinus tenderness. Left sinus exhibits no maxillary sinus tenderness and no frontal sinus tenderness.   Mouth/Throat: Uvula is midline and mucous membranes are normal. No trismus in the jaw. Normal dentition. No uvula swelling. Posterior oropharyngeal erythema present.   +PND  +Cobblestoning   Eyes: Conjunctivae and lids are normal. No scleral icterus.   Sclera clear bilat   Neck: Trachea normal, full passive range of motion without pain and phonation normal. Neck supple.   Cardiovascular: Normal rate, regular rhythm, normal heart sounds, intact distal pulses and normal pulses.    Pulmonary/Chest: Effort normal and breath sounds normal. No respiratory distress. He has no wheezes.   Abdominal: Soft. Normal appearance and bowel sounds are normal. He exhibits no distension. There is no tenderness.   Musculoskeletal: Normal range of motion. He exhibits no edema or deformity.   Lymphadenopathy:     He has cervical adenopathy.   Neurological: He is alert and oriented to person, place, and time. He exhibits normal muscle tone. Coordination normal.   Skin: Skin is warm, dry and intact. He is not diaphoretic. No pallor.   Psychiatric: He has a normal mood and affect. His speech is normal and behavior is normal. Judgment and thought content normal. Cognition and memory are normal.   Nursing note and vitals reviewed.      Assessment:       1. Viral pharyngitis        Plan:         Viral pharyngitis  -     betamethasone acetate-betamethasone sodium phosphate injection 6 mg; Inject 1 mL (6 mg total) into the muscle one time.    Pt insisted on steroid injection. Received one when he saw ent. Cautioned on frequent use of steroids, f/u with ENT if not resolved with OTC meds.     Patient Instructions     Please follow up with your ENT doctor for further  treatment and testing.     Use over the counter flonase: one spray each nostril twice daily OR two sprays each nostril once daily.     Please take an over the counter antihistamine medication (allegra/Claritin/Zyrtec) of your choice as directed.    Sore throat recommendations: Warm fluids, warm salt water gargles, throat lozenges, tea, honey, soup, rest, hydration.    Please return or see your primary care doctor if you develop new or worsening symptoms.     You must understand that you have received an Urgent Care treatment only and that you may be released before all of your medical problems are known or treated.      Do not get more than 3 steroid injections in 1 year.     Viral Pharyngitis (Sore Throat)    You (or your child, if your child is the patient) have pharyngitis (sore throat). This infection is caused by a virus. It can cause throat pain that is worse when swallowing, aching all over, headache, and fever. The infection may be spread by coughing, kissing, or touching others after touching your mouth or nose. Antibiotic medications do not work against viruses, so they are not used for treating this condition.  Home care  · If your symptoms are severe, rest at home. Return to work or school when you feel well enough.   · Drink plenty of fluids to avoid dehydration.  · For children: Use acetaminophen for fever, fussiness or discomfort. In infants over six months of age, you may use ibuprofen instead of acetaminophen. (NOTE: If your child has chronic liver or kidney disease or ever had a stomach ulcer or GI bleeding, talk with your doctor before using these medicines.) (NOTE: Aspirin should never be used in anyone under 18 years of age who is ill with a fever. It may cause severe liver damage.)   · For adults: You may use acetaminophen or ibuprofen to control pain or fever, unless another medicine was prescribed for this. (NOTE: If you have chronic liver or kidney disease or ever had a stomach ulcer or GI  bleeding, talk with your doctor before using these medicines.)  · Throat lozenges or numbing throat sprays can help reduce pain. Gargling with warm salt water will also help reduce throat pain. For this, dissolve 1/2 teaspoon of salt in 1 glass of warm water. To help soothe a sore throat, children can sip on juice or a popsicle. Children 5 years and older can also suck on a lollipop or hard candy.  · Avoid salty or spicy foods, which can be irritating to the throat.  Follow-up care  Follow up with your healthcare provider or our staff if you are not improving over the next week.  When to seek medical advice  Call your healthcare provider right away if any of these occur:  · Fever as directed by your doctor.  For children, seek care if:  ¨ Your child is of any age and has repeated fevers above 104°F (40°C).  ¨ Your child is younger than 2 years of age and has a fever of 100.4°F (38°C) that continues for more than 1 day.  ¨ Your child is 2 years old or older and has a fever of 100.4°F (38°C) that continues for more than 3 days.  · New or worsening ear pain, sinus pain, or headache  · Painful lumps in the back of neck  · Stiff neck  · Lymph nodes are getting larger  · Inability to swallow liquids, excessive drooling, or inability to open mouth wide due to throat pain  · Signs of dehydration (very dark urine or no urine, sunken eyes, dizziness)  · Trouble breathing or noisy breathing  · Muffled voice  · New rash  · Child appears to be getting sicker  Date Last Reviewed: 4/13/2015  © 1438-3705 [employer]. [address] [place] [address]. All rights reserved. This information is not intended as a substitute for professional medical care. Always follow your healthcare professional's instructions.        Self-Care for Sore Throats    Sore throats happen for many reasons, such as colds, allergies, and infections caused by viruses or bacteria. In any case, your throat becomes red and sore. Your goal  for self-care is to reduce your discomfort while giving your throat a chance to heal.  Moisten and soothe your throat  Tips include the following:  · Try a sip of water first thing after waking up.  · Keep your throat moist by drinking 6 or more glasses of clear liquids every day.  · Run a cool-air humidifier in your room overnight.  · Avoid cigarette smoke.   · Suck on throat lozenges, cough drops, hard candy, ice chips, or frozen fruit-juice bars. Use the sugar-free versions if your diet or medical condition requires them.  Gargle to ease irritation  Gargling every hour or 2 can ease irritation. Try gargling with 1 of these solutions:  · 1/4 teaspoon of salt in 1/2 cup of warm water  · An over-the-counter anesthetic gargle  Use medicine for more relief  Over-the-counter medicine can reduce sore throat symptoms. Ask your pharmacist if you have questions about which medicine to use:  · Ease pain with anesthetic sprays. Aspirin or an aspirin substitute also helps. Remember, never give aspirin to anyone 18 or younger, or if you are already taking blood thinners.   · For sore throats caused by allergies, try antihistamines to block the allergic reaction.  · Remember: unless a sore throat is caused by a bacterial infection, antibiotics wont help you.  Prevent future sore throats  Prevention tips include the following:  · Stop smoking or reduce contact with secondhand smoke. Smoke irritates the tender throat lining.  · Limit contact with pets and with allergy-causing substances, such as pollen and mold.  · When youre around someone with a sore throat or cold, wash your hands often to keep viruses or bacteria from spreading.  · Dont strain your vocal cords.  Call your healthcare provider  Contact your healthcare provider if you have:  · A temperature over 101°F (38.3°C)  · White spots on the throat  · Great difficulty swallowing  · Trouble breathing  · A skin rash  · Recent exposure to someone else with strep  bacteria  · Severe hoarseness and swollen glands in the neck or jaw   Date Last Reviewed: 8/1/2016  © 7399-3402 [employer]. [address] [place] [address], [place] [address]. All rights reserved. This information is not intended as a substitute for professional medical care. Always follow your healthcare professional's instructions.        When You Have a Sore Throat    A sore throat can be painful. There are many reasons why you may have a sore throat. Your healthcare provider will work with you to find the cause of your sore throat. He or she will also find the best treatment for you.  What causes a sore throat?  Sore throats can be caused or worsened by:  · Cold or flu viruses  · Bacteria  · Irritants such as tobacco smoke or air pollution  · Acid reflux  A healthy throat  The tonsils are on the sides of the throat near the base of the tongue. They collect viruses and bacteria and help fight infection. The throat (pharynx) is the passage for air. Mucus from the nasal cavity also moves down the passage.  An inflamed throat  The tonsils and pharynx can become inflamed due to a cold or flu virus. Postnasal drip (excess mucus draining from the nasal cavity) can irritate the throat. It can also make the throat or tonsils more likely to be infected by bacteria. Severe, untreated tonsillitis in children or adults can cause a pocket of pus (abscess) to form near the tonsil.  Your evaluation  A medical evaluation can help find the cause of your sore throat. It can also help your healthcare provider choose the best treatment for you. The evaluation may include a health history, physical exam, and diagnostic tests.  Health history  Your healthcare provider may ask you the following:  · How long has the sore throat lasted and how have you been treating it?  · Do you have any other symptoms, such as body aches, fever, or cough?  · Does your sore throat recur? If so, how often? How many days of school or work have you  "missed because of a sore throat?  · Do you have trouble eating or swallowing?  · Have you been told that you snore or have other sleep problems?  · Do you have bad breath?  · Do you cough up bad-tasting mucus?  Physical exam  During the exam, your healthcare provider checks your ears, nose, and throat for problems. He or she also checks for swelling in the neck, and may listen to your chest.  Possible tests  Other tests your healthcare provider may perform include:  · A throat swab to check for bacteria such as streptococcus (the bacteria that causes strep throat)  · A blood test to check for mononucleosis (a viral infection)  · A chest X-ray to rule out pneumonia, especially if you have a cough  Treating a sore throat  Treatment depends on many factors. What is the likely cause? Is the problem recent? Does it keep coming back? In many cases, the best thing to do is to treat the symptoms, rest, and let the problem heal itself. Antibiotics may help clear up some bacterial infections. For cases of severe or recurring tonsillitis, the tonsils may need to be removed.  Relieving your symptoms  · Dont smoke, and avoid secondhand smoke.  · For children, try throat sprays or Popsicles. Adults and older children may try lozenges.  · Drink warm liquids to soothe the throat and help thin mucus. Avoid alcohol, spicy foods, and acidic drinks such as orange juice. These can irritate the throat.  · Gargle with warm saltwater (1 teaspoon of salt to 8 ounces of warm water).  · Use a humidifier to keep air moist and relieve throat dryness.  · Try over-the-counter pain relievers such as acetaminophen or ibuprofen. Use as directed, and dont exceed the recommended dose. Dont give aspirin to children.   Are antibiotics needed?  If your sore throat is due to a bacterial infection, antibiotics may speed healing and prevent complications. Although group A streptococcus ("strep throat" or GAS) is the major treatable infection for a sore " throat, GAS causes only 5% to 15% of sore throats in adults who seek medical care. Most sore throats are caused by cold or flu viruses. And antibiotics dont treat viral illness. In fact, using antibiotics when theyre not needed may produce bacteria that are harder to kill. Your healthcare provider will prescribe antibiotics only if he or she thinks they are likely to help.  If antibiotics are prescribed  Take the medicine exactly as directed. Be sure to finish your prescription even if youre feeling better. And be sure to ask your healthcare provider or pharmacist what side effects are common and what to do about them.  Is surgery needed?  In some cases, tonsils need to be removed. This is often done as outpatient (same-day) surgery. Your healthcare provider may advise removing the tonsils in cases of:  · Several severe bouts of tonsillitis in a year. Severe episodes include those that lead to missed days of school or work, or that need to be treated with antibiotics.  · Tonsillitis that causes breathing problems during sleep  · Tonsillitis caused by food particles collecting in pouches in the tonsils (cryptic tonsillitis)  Call your healthcare provider if any of the following occur:  · Symptoms worsen, or new symptoms develop.  · Swollen tonsils make breathing difficult.  · The pain is severe enough to keep you from drinking liquids.  · A skin rash, hives, or wheezing develops. Any of these could signal an allergic reaction to antibiotics.  · Symptoms dont improve within a week.  · Symptoms dont improve within 2 to 3 days of starting antibiotics.   Date Last Reviewed: 10/1/2016  © 3728-1824 [employer]. [address] [place] [address], [place] [address]. All rights reserved. This information is not intended as a substitute for professional medical care. Always follow your healthcare professional's instructions.

## 2018-03-19 NOTE — PATIENT INSTRUCTIONS
Please follow up with your ENT doctor for further treatment and testing.     Use over the counter flonase: one spray each nostril twice daily OR two sprays each nostril once daily.     Please take an over the counter antihistamine medication (allegra/Claritin/Zyrtec) of your choice as directed.    Sore throat recommendations: Warm fluids, warm salt water gargles, throat lozenges, tea, honey, soup, rest, hydration.    Please return or see your primary care doctor if you develop new or worsening symptoms.     You must understand that you have received an Urgent Care treatment only and that you may be released before all of your medical problems are known or treated.      Do not get more than 3 steroid injections in 1 year.     Viral Pharyngitis (Sore Throat)    You (or your child, if your child is the patient) have pharyngitis (sore throat). This infection is caused by a virus. It can cause throat pain that is worse when swallowing, aching all over, headache, and fever. The infection may be spread by coughing, kissing, or touching others after touching your mouth or nose. Antibiotic medications do not work against viruses, so they are not used for treating this condition.  Home care  · If your symptoms are severe, rest at home. Return to work or school when you feel well enough.   · Drink plenty of fluids to avoid dehydration.  · For children: Use acetaminophen for fever, fussiness or discomfort. In infants over six months of age, you may use ibuprofen instead of acetaminophen. (NOTE: If your child has chronic liver or kidney disease or ever had a stomach ulcer or GI bleeding, talk with your doctor before using these medicines.) (NOTE: Aspirin should never be used in anyone under 18 years of age who is ill with a fever. It may cause severe liver damage.)   · For adults: You may use acetaminophen or ibuprofen to control pain or fever, unless another medicine was prescribed for this. (NOTE: If you have chronic liver or  kidney disease or ever had a stomach ulcer or GI bleeding, talk with your doctor before using these medicines.)  · Throat lozenges or numbing throat sprays can help reduce pain. Gargling with warm salt water will also help reduce throat pain. For this, dissolve 1/2 teaspoon of salt in 1 glass of warm water. To help soothe a sore throat, children can sip on juice or a popsicle. Children 5 years and older can also suck on a lollipop or hard candy.  · Avoid salty or spicy foods, which can be irritating to the throat.  Follow-up care  Follow up with your healthcare provider or our staff if you are not improving over the next week.  When to seek medical advice  Call your healthcare provider right away if any of these occur:  · Fever as directed by your doctor.  For children, seek care if:  ¨ Your child is of any age and has repeated fevers above 104°F (40°C).  ¨ Your child is younger than 2 years of age and has a fever of 100.4°F (38°C) that continues for more than 1 day.  ¨ Your child is 2 years old or older and has a fever of 100.4°F (38°C) that continues for more than 3 days.  · New or worsening ear pain, sinus pain, or headache  · Painful lumps in the back of neck  · Stiff neck  · Lymph nodes are getting larger  · Inability to swallow liquids, excessive drooling, or inability to open mouth wide due to throat pain  · Signs of dehydration (very dark urine or no urine, sunken eyes, dizziness)  · Trouble breathing or noisy breathing  · Muffled voice  · New rash  · Child appears to be getting sicker  Date Last Reviewed: 4/13/2015  © 2267-2782 [employer]. [address] [place] [address], [place] [address]. All rights reserved. This information is not intended as a substitute for professional medical care. Always follow your healthcare professional's instructions.        Self-Care for Sore Throats    Sore throats happen for many reasons, such as colds, allergies, and infections caused by viruses or bacteria. In any  case, your throat becomes red and sore. Your goal for self-care is to reduce your discomfort while giving your throat a chance to heal.  Moisten and soothe your throat  Tips include the following:  · Try a sip of water first thing after waking up.  · Keep your throat moist by drinking 6 or more glasses of clear liquids every day.  · Run a cool-air humidifier in your room overnight.  · Avoid cigarette smoke.   · Suck on throat lozenges, cough drops, hard candy, ice chips, or frozen fruit-juice bars. Use the sugar-free versions if your diet or medical condition requires them.  Gargle to ease irritation  Gargling every hour or 2 can ease irritation. Try gargling with 1 of these solutions:  · 1/4 teaspoon of salt in 1/2 cup of warm water  · An over-the-counter anesthetic gargle  Use medicine for more relief  Over-the-counter medicine can reduce sore throat symptoms. Ask your pharmacist if you have questions about which medicine to use:  · Ease pain with anesthetic sprays. Aspirin or an aspirin substitute also helps. Remember, never give aspirin to anyone 18 or younger, or if you are already taking blood thinners.   · For sore throats caused by allergies, try antihistamines to block the allergic reaction.  · Remember: unless a sore throat is caused by a bacterial infection, antibiotics wont help you.  Prevent future sore throats  Prevention tips include the following:  · Stop smoking or reduce contact with secondhand smoke. Smoke irritates the tender throat lining.  · Limit contact with pets and with allergy-causing substances, such as pollen and mold.  · When youre around someone with a sore throat or cold, wash your hands often to keep viruses or bacteria from spreading.  · Dont strain your vocal cords.  Call your healthcare provider  Contact your healthcare provider if you have:  · A temperature over 101°F (38.3°C)  · White spots on the throat  · Great difficulty swallowing  · Trouble breathing  · A skin  rash  · Recent exposure to someone else with strep bacteria  · Severe hoarseness and swollen glands in the neck or jaw   Date Last Reviewed: 8/1/2016 © 2000-2017 [employer]. [address] [place] [address], [place] [address]. All rights reserved. This information is not intended as a substitute for professional medical care. Always follow your healthcare professional's instructions.        When You Have a Sore Throat    A sore throat can be painful. There are many reasons why you may have a sore throat. Your healthcare provider will work with you to find the cause of your sore throat. He or she will also find the best treatment for you.  What causes a sore throat?  Sore throats can be caused or worsened by:  · Cold or flu viruses  · Bacteria  · Irritants such as tobacco smoke or air pollution  · Acid reflux  A healthy throat  The tonsils are on the sides of the throat near the base of the tongue. They collect viruses and bacteria and help fight infection. The throat (pharynx) is the passage for air. Mucus from the nasal cavity also moves down the passage.  An inflamed throat  The tonsils and pharynx can become inflamed due to a cold or flu virus. Postnasal drip (excess mucus draining from the nasal cavity) can irritate the throat. It can also make the throat or tonsils more likely to be infected by bacteria. Severe, untreated tonsillitis in children or adults can cause a pocket of pus (abscess) to form near the tonsil.  Your evaluation  A medical evaluation can help find the cause of your sore throat. It can also help your healthcare provider choose the best treatment for you. The evaluation may include a health history, physical exam, and diagnostic tests.  Health history  Your healthcare provider may ask you the following:  · How long has the sore throat lasted and how have you been treating it?  · Do you have any other symptoms, such as body aches, fever, or cough?  · Does your sore throat recur? If so,  "how often? How many days of school or work have you missed because of a sore throat?  · Do you have trouble eating or swallowing?  · Have you been told that you snore or have other sleep problems?  · Do you have bad breath?  · Do you cough up bad-tasting mucus?  Physical exam  During the exam, your healthcare provider checks your ears, nose, and throat for problems. He or she also checks for swelling in the neck, and may listen to your chest.  Possible tests  Other tests your healthcare provider may perform include:  · A throat swab to check for bacteria such as streptococcus (the bacteria that causes strep throat)  · A blood test to check for mononucleosis (a viral infection)  · A chest X-ray to rule out pneumonia, especially if you have a cough  Treating a sore throat  Treatment depends on many factors. What is the likely cause? Is the problem recent? Does it keep coming back? In many cases, the best thing to do is to treat the symptoms, rest, and let the problem heal itself. Antibiotics may help clear up some bacterial infections. For cases of severe or recurring tonsillitis, the tonsils may need to be removed.  Relieving your symptoms  · Dont smoke, and avoid secondhand smoke.  · For children, try throat sprays or Popsicles. Adults and older children may try lozenges.  · Drink warm liquids to soothe the throat and help thin mucus. Avoid alcohol, spicy foods, and acidic drinks such as orange juice. These can irritate the throat.  · Gargle with warm saltwater (1 teaspoon of salt to 8 ounces of warm water).  · Use a humidifier to keep air moist and relieve throat dryness.  · Try over-the-counter pain relievers such as acetaminophen or ibuprofen. Use as directed, and dont exceed the recommended dose. Dont give aspirin to children.   Are antibiotics needed?  If your sore throat is due to a bacterial infection, antibiotics may speed healing and prevent complications. Although group A streptococcus ("strep throat" " or GAS) is the major treatable infection for a sore throat, GAS causes only 5% to 15% of sore throats in adults who seek medical care. Most sore throats are caused by cold or flu viruses. And antibiotics dont treat viral illness. In fact, using antibiotics when theyre not needed may produce bacteria that are harder to kill. Your healthcare provider will prescribe antibiotics only if he or she thinks they are likely to help.  If antibiotics are prescribed  Take the medicine exactly as directed. Be sure to finish your prescription even if youre feeling better. And be sure to ask your healthcare provider or pharmacist what side effects are common and what to do about them.  Is surgery needed?  In some cases, tonsils need to be removed. This is often done as outpatient (same-day) surgery. Your healthcare provider may advise removing the tonsils in cases of:  · Several severe bouts of tonsillitis in a year. Severe episodes include those that lead to missed days of school or work, or that need to be treated with antibiotics.  · Tonsillitis that causes breathing problems during sleep  · Tonsillitis caused by food particles collecting in pouches in the tonsils (cryptic tonsillitis)  Call your healthcare provider if any of the following occur:  · Symptoms worsen, or new symptoms develop.  · Swollen tonsils make breathing difficult.  · The pain is severe enough to keep you from drinking liquids.  · A skin rash, hives, or wheezing develops. Any of these could signal an allergic reaction to antibiotics.  · Symptoms dont improve within a week.  · Symptoms dont improve within 2 to 3 days of starting antibiotics.   Date Last Reviewed: 10/1/2016  © 4467-6866 [employer]. [address] [place] [address], [place] [address]. All rights reserved. This information is not intended as a substitute for professional medical care. Always follow your healthcare professional's instructions.

## 2018-04-17 ENCOUNTER — HOSPITAL ENCOUNTER (OUTPATIENT)
Dept: RADIOLOGY | Facility: HOSPITAL | Age: 73
Discharge: HOME | End: 2018-04-17
Attending: NEUROLOGICAL SURGERY
Payer: MEDICARE

## 2018-04-17 ENCOUNTER — OFFICE VISIT (OUTPATIENT)
Dept: NEUROSURGERY | Facility: CLINIC | Age: 73
End: 2018-04-17
Payer: MEDICARE

## 2018-04-17 VITALS
SYSTOLIC BLOOD PRESSURE: 110 MMHG | WEIGHT: 164.25 LBS | BODY MASS INDEX: 23.23 KG/M2 | HEART RATE: 60 BPM | DIASTOLIC BLOOD PRESSURE: 62 MMHG

## 2018-04-17 DIAGNOSIS — Z98.1 S/P CERVICAL SPINAL FUSION: ICD-10-CM

## 2018-04-17 DIAGNOSIS — M54.50 CHRONIC BILATERAL LOW BACK PAIN WITHOUT SCIATICA: ICD-10-CM

## 2018-04-17 DIAGNOSIS — G89.29 CHRONIC BILATERAL LOW BACK PAIN WITHOUT SCIATICA: Primary | ICD-10-CM

## 2018-04-17 DIAGNOSIS — R15.9 INCONTINENCE OF FECES, UNSPECIFIED FECAL INCONTINENCE TYPE: ICD-10-CM

## 2018-04-17 DIAGNOSIS — G95.9 CERVICAL MYELOPATHY: ICD-10-CM

## 2018-04-17 DIAGNOSIS — M54.50 CHRONIC BILATERAL LOW BACK PAIN WITHOUT SCIATICA: Primary | ICD-10-CM

## 2018-04-17 DIAGNOSIS — G89.29 CHRONIC BILATERAL LOW BACK PAIN WITHOUT SCIATICA: ICD-10-CM

## 2018-04-17 DIAGNOSIS — M81.0 OSTEOPOROSIS, UNSPECIFIED OSTEOPOROSIS TYPE, UNSPECIFIED PATHOLOGICAL FRACTURE PRESENCE: ICD-10-CM

## 2018-04-17 PROCEDURE — 72120 X-RAY BEND ONLY L-S SPINE: CPT | Mod: 26,,, | Performed by: RADIOLOGY

## 2018-04-17 PROCEDURE — 99214 OFFICE O/P EST MOD 30 MIN: CPT | Mod: S$GLB,,, | Performed by: NEUROLOGICAL SURGERY

## 2018-04-17 PROCEDURE — 3078F DIAST BP <80 MM HG: CPT | Mod: CPTII,S$GLB,, | Performed by: NEUROLOGICAL SURGERY

## 2018-04-17 PROCEDURE — 72100 X-RAY EXAM L-S SPINE 2/3 VWS: CPT | Mod: 26,,, | Performed by: RADIOLOGY

## 2018-04-17 PROCEDURE — 3074F SYST BP LT 130 MM HG: CPT | Mod: CPTII,S$GLB,, | Performed by: NEUROLOGICAL SURGERY

## 2018-04-17 PROCEDURE — 99999 PR PBB SHADOW E&M-EST. PATIENT-LVL III: CPT | Mod: PBBFAC,,, | Performed by: NEUROLOGICAL SURGERY

## 2018-04-17 PROCEDURE — 72120 X-RAY BEND ONLY L-S SPINE: CPT | Mod: TC,FY

## 2018-04-18 ENCOUNTER — TELEPHONE (OUTPATIENT)
Dept: NEUROSURGERY | Facility: CLINIC | Age: 73
End: 2018-04-18

## 2018-04-18 DIAGNOSIS — S32.019A CLOSED FRACTURE OF FIRST LUMBAR VERTEBRA, UNSPECIFIED FRACTURE MORPHOLOGY, INITIAL ENCOUNTER: Primary | ICD-10-CM

## 2018-04-18 NOTE — TELEPHONE ENCOUNTER
----- Message from Po Huff MD sent at 4/17/2018  5:40 PM CDT -----  Please call him to let him know that he has a L1 compression fracture. It is hard to determine if the fracture is recent. Ask him if he has had a lumbar spine MRI done recently. All the films he brought to us was for his cervical spine. He needs to follow-up with his primary care for osteoporosis evaluation and treatment.

## 2018-04-18 NOTE — TELEPHONE ENCOUNTER
Spoke to patient and informed him of L1 fracture per Dr. Refugio jones note. Instructed to follow up with PCP to get an osteopetrosis evaluation. Verbalizes understanding. Patient states he was aware of the L1 fracture, he fell 5 years ago and did this but was under the impression that it had healed. Please advise.

## 2018-04-19 NOTE — PROGRESS NOTES
NEUROSURGICAL OUTPATIENT CONSULTATION NOTE    DATE OF SERVICE:  04/19/2018    ATTENDING PHYSICIAN:  Po Huff MD    CONSULT REQUESTED BY:  Self referred    REASON FOR CONSULT:  Fecal incontinence    SUBJECTIVE:    HISTORY OF PRESENT ILLNESS:  This is a very pleasant 72 y.o. male who has C4-5, C5-6 and C6-7 ACDF about 2 years ago for cervical myelopathy. His myelopathy symptoms has remained stable since the surgery. He denies having more hands weakness, gait imbalance or numbness. He has chronic low back pain. He has a chronic L1 compression fracture. He was complaining of occasional fecal incontinence. No perineal loss of sensation. No urinary retention, urgency or incontinence.     Low Back Pain Scale  R Low Back-Pain Score: 3  R Low Back-Pain Intensity: The pain is bad, but I manage without taking pain killers  R Low Back-Pain Score: I can look after myself normally without causing extra pain  Low Back-Lifting: Pain prevents me from lifting heavy weights off the floor, but I can manage if they are conveniently positioned for example on a table   Low Back-Walking: Pain does not prevent me from walking any distance   Low Back-Sitting: I can sit in any chair as long as I like   Low Back-Standing: I can stand as long as I want without pain   Low Back-Sleeping: I have no pain in bed   Low Back-Social Life: My social life is normal and give me no pain   Low Back-Traveling: I have no pain when traveling   Low Back-Changing Degree of Pain: My pain seems to be getting better but improvement is slow (My pain is neither getting better nor worse)         PAST MEDICAL HISTORY:  Active Ambulatory Problems     Diagnosis Date Noted    Hypertension     Headache 11/08/2012    Neck pain 12/26/2013    Cervical spine disease 01/16/2014    Cervical stenosis of spinal canal 03/14/2014    Cervical spondylosis with myelopathy 03/14/2014    Cervical radiculopathy 10/24/2014    Acute prostatitis 01/20/2018     Resolved  Ambulatory Problems     Diagnosis Date Noted    No Resolved Ambulatory Problems     Past Medical History:   Diagnosis Date    Basal cell carcinoma of forearm 2003    Basal cell carcinoma of forehead 2013    Cancer     Headache(784.0)     Hypertension     Retroperitoneal bleed 1980    Syncope and collapse        PAST SURGICAL HISTORY:  Past Surgical History:   Procedure Laterality Date    HIP FRACTURE SURGERY Right 1993    Left shoulder surgery      SHOULDER ARTHROSCOPY W/ ROTATOR CUFF REPAIR Right 2014    SHOULDER ARTHROSCOPY W/ ROTATOR CUFF REPAIR Left 2013    TRANSURETHRAL RESECTION OF PROSTATE N/A 2014       SOCIAL HISTORY:   Social History     Social History    Marital status:      Spouse name: N/A    Number of children: N/A    Years of education: N/A     Occupational History    Not on file.     Social History Main Topics    Smoking status: Never Smoker    Smokeless tobacco: Never Used    Alcohol use 3.6 oz/week     6 Cans of beer per week      Comment: occ.    Drug use: No    Sexual activity: Not on file     Other Topics Concern    Not on file     Social History Narrative    No narrative on file       FAMILY HISTORY:  Family History   Problem Relation Age of Onset    Heart disease Father     Heart disease Brother        CURRENTS MEDICATIONS:  Current Outpatient Prescriptions on File Prior to Visit   Medication Sig Dispense Refill    acetaminophen-codeine 300-30mg (TYLENOL #3) 300-30 mg Tab Take 1 tablet by mouth 3 (three) times daily as needed.  1    aspirin (ECOTRIN) 81 MG EC tablet Take 81 mg by mouth once daily.      betamethasone valerate 0.1% (VALISONE) 0.1 % Lotn 2 (two) times daily.   2    ibuprofen (ADVIL,MOTRIN) 800 MG tablet 800 mg 3 (three) times daily.       pantoprazole (PROTONIX) 40 MG tablet 2 (two) times daily.       triamcinolone acetonide 0.1% (KENALOG) 0.1 % cream       triamterene-hydrochlorothiazide 37.5-25 mg (DYAZIDE) 37.5-25 mg per capsule        azithromycin (Z-RYNE) 250 MG tablet TAKE 2 TABLETS BY MOUTH TODAY, THEN TAKE 1 TABLET DAILY FOR 4 DAYS  0    cephALEXin (KEFLEX) 500 MG capsule       diazepam (VALIUM) 5 MG tablet TAKE 1 TABLET BY MOUTH 30 MINUTES PRIOR TO MRI  0    gabapentin (NEURONTIN) 100 MG capsule Take 100 mg by mouth 2 (two) times daily.  2    hydrocodone-acetaminophen 5-325mg (NORCO) 5-325 mg per tablet 1 tablet.       meclizine (ANTIVERT) 25 mg tablet 2 (two) times daily as needed.       methocarbamol (ROBAXIN) 500 MG Tab Take 500 mg by mouth 2 (two) times daily as needed.      promethazine-codeine 6.25-10 mg/5 ml (PHENERGAN WITH CODEINE) 6.25-10 mg/5 mL syrup every 6 (six) hours as needed.   0    sertraline (ZOLOFT) 25 MG tablet Take 25 mg by mouth once daily.  6    tizanidine (ZANAFLEX) 4 MG tablet Take 4 mg by mouth 2 (two) times daily as needed.       No current facility-administered medications on file prior to visit.        ALLERGIES:  Review of patient's allergies indicates:   Allergen Reactions    Ciprofloxacin (bulk)     Seconal [secobarbital sodium] Other (See Comments)     hallucinations       REVIEW OF SYSTEMS:  Review of Systems   Constitutional: Negative for diaphoresis, fever and weight loss.   Respiratory: Negative for shortness of breath.    Cardiovascular: Negative for chest pain.   Gastrointestinal: Negative for blood in stool.   Genitourinary: Negative for hematuria.   Endo/Heme/Allergies: Does not bruise/bleed easily.   All other systems reviewed and are negative.      OBJECTIVE:    PHYSICAL EXAMINATION:   Vitals:    04/17/18 1500   BP: 110/62   Pulse: 60       Physical Exam:  Vitals reviewed.    Constitutional: He appears well-developed and well-nourished.     Eyes: Pupils are equal, round, and reactive to light. Conjunctivae and EOM are normal.     Cardiovascular: Normal distal pulses and no edema.     Abdominal: Soft.     Skin: Skin displays no rash on trunk and no rash on extremities. Skin displays no  lesions on trunk and no lesions on extremities.     Psych/Behavior: He is alert. He is oriented to person, place, and time. He has a normal mood and affect.     Musculoskeletal:        Neck: Range of motion is full.     Neurological:        DTRs: Tricep reflexes are 2+ on the right side and 2+ on the left side. Bicep reflexes are 2+ on the right side and 2+ on the left side. Brachioradialis reflexes are 2+ on the right side and 2+ on the left side. Patellar reflexes are 3+ on the right side and 3+ on the left side. Achilles reflexes are 1+ on the right side and 1+ on the left side.       Back Exam     Tenderness   The patient is experiencing no tenderness.     Range of Motion   Extension: normal   Flexion: normal   Lateral Bend Right: normal   Lateral Bend Left: normal   Rotation Right: normal   Rotation Left: normal     Muscle Strength   Right Quadriceps:  5/5   Left Quadriceps:  5/5   Right Hamstrings:  5/5   Left Hamstrings:  5/5     Tests   Straight leg raise right: negative  Straight leg raise left: negative    Other   Toe Walk: normal  Heel Walk: normal            SI joint:   Palpation at the right and left SI joints not painful  JOSE test is negative bilaterally  Gaenslen test is negative bilaterally  Thigh thrust test is negative bilaterally    Neurologic Exam     Mental Status   Oriented to person, place, and time.   Speech: speech is normal   Level of consciousness: alert    Cranial Nerves   Cranial nerves II through XII intact.     CN III, IV, VI   Pupils are equal, round, and reactive to light.  Extraocular motions are normal.     Motor Exam   Muscle bulk: normal  Overall muscle tone: normal    Strength   Right deltoid: 5/5  Left deltoid: 5/5  Right biceps: 5/5  Left biceps: 5/5  Right triceps: 5/5  Left triceps: 5/5  Right wrist flexion: 5/5  Left wrist flexion: 5/5  Right wrist extension: 5/5  Left wrist extension: 5/5  Right interossei: 4/5  Left interossei: 4/5  Right iliopsoas: 5/5  Left iliopsoas:  5/5  Right quadriceps: 5/5  Left quadriceps: 5/5  Right hamstrin/5  Left hamstrin/5  Right anterior tibial: 5/5  Left anterior tibial: 5/5  Right posterior tibial: 5/5  Left posterior tibial: 5/5  Right peroneal: 5/5  Left peroneal: 5/5  Right gastroc: 5/5  Left gastroc: 5/5    Sensory Exam   Light touch normal.   Pinprick normal.     Gait, Coordination, and Reflexes     Gait  Gait: normal    Coordination   Finger to nose coordination: normal  Tandem walking coordination: normal    Reflexes   Right brachioradialis: 2+  Left brachioradialis: 2+  Right biceps: 2+  Left biceps: 2+  Right triceps: 2+  Left triceps: 2+  Right patellar: 3+  Left patellar: 3+  Right achilles: 1+  Left achilles: 1+  Right plantar: normal  Left plantar: normal  Right Newsome: absent  Left Newsome: absent  Right ankle clonus: absent  Left ankle clonus: absent        DIAGNOSTIC DATA:  I personally reviewed the following imaging:   Prior cervical CT 7476-2253: Consolidation of C4 to C7 fusion  Lumbar XR today: chronic compression L1 fracture, loss of VB height at L3 and L4    ASSESMENT:  This is a 72 y.o. male with     Problem List Items Addressed This Visit     None      Visit Diagnoses     Chronic bilateral low back pain without sciatica    -  Primary    Relevant Orders    X-Ray Lumbar Spine Ap Lateral w/Flex Ext (Completed)    Osteoporosis, unspecified osteoporosis type, unspecified pathological fracture presence        S/P cervical spinal fusion        Cervical myelopathy        Incontinence of feces, unspecified fecal incontinence type            Occasional fecal incontinence does not have a neurological cause    PLAN:  DEXA to evaluate osteoporosis  Follow-up in 5-6 months to re-evaluate myelopathy symptoms and signs.            Po Huff MD  Pager: [phone]

## 2018-04-20 ENCOUNTER — HOSPITAL ENCOUNTER (OUTPATIENT)
Dept: RADIOLOGY | Facility: HOSPITAL | Age: 73
Discharge: HOME | End: 2018-04-20
Attending: NEUROLOGICAL SURGERY
Payer: MEDICARE

## 2018-04-20 DIAGNOSIS — S32.019A CLOSED FRACTURE OF FIRST LUMBAR VERTEBRA, UNSPECIFIED FRACTURE MORPHOLOGY, INITIAL ENCOUNTER: ICD-10-CM

## 2018-04-20 PROCEDURE — 77081 DXA BONE DENSITY APPENDICULR: CPT | Mod: TC

## 2018-04-20 PROCEDURE — 77081 DXA BONE DENSITY APPENDICULR: CPT | Mod: 26,,, | Performed by: RADIOLOGY

## 2018-04-21 DIAGNOSIS — M80.00XS OSTEOPOROSIS WITH CURRENT PATHOLOGICAL FRACTURE, UNSPECIFIED OSTEOPOROSIS TYPE, SEQUELA: ICD-10-CM

## 2018-04-21 DIAGNOSIS — S32.010S CLOSED COMPRESSION FRACTURE OF FIRST LUMBAR VERTEBRA, SEQUELA: Primary | ICD-10-CM

## 2018-04-21 RX ORDER — VITAMIN E (DL,TOCOPHERYL ACET) 45 MG/0.25
1 DROPS ORAL 2 TIMES DAILY
Qty: 60 TABLET | Refills: 11 | COMMUNITY
Start: 2018-04-21 | End: 2019-03-14

## 2018-04-23 ENCOUNTER — TELEPHONE (OUTPATIENT)
Dept: NEUROSURGERY | Facility: CLINIC | Age: 73
End: 2018-04-23

## 2018-04-23 NOTE — TELEPHONE ENCOUNTER
----- Message from Po Huff MD sent at 4/21/2018 10:17 AM CDT -----  I have ordered calcium and vitamin D supplement for his osteoporosis. I put a outpatient referral with endocrinology as well.

## 2018-04-24 ENCOUNTER — TELEPHONE (OUTPATIENT)
Dept: NEUROSURGERY | Facility: CLINIC | Age: 73
End: 2018-04-24

## 2018-04-24 NOTE — TELEPHONE ENCOUNTER
Left message for patient to return my call in reference to Dr. Huff ordered Vitamin D supplement for osteoporosis. And referred him to endocrinology.

## 2018-04-24 NOTE — TELEPHONE ENCOUNTER
----- Message from Crystal Greenfield sent at 4/24/2018 11:53 AM CDT -----  Contact: [phone]  Patient called in returning your call. Please advise

## 2019-03-14 ENCOUNTER — OFFICE VISIT (OUTPATIENT)
Dept: CARDIOLOGY | Facility: CLINIC | Age: 74
End: 2019-03-14
Payer: MEDICARE

## 2019-03-14 VITALS
WEIGHT: 169.44 LBS | DIASTOLIC BLOOD PRESSURE: 82 MMHG | HEIGHT: 71 IN | SYSTOLIC BLOOD PRESSURE: 121 MMHG | HEART RATE: 64 BPM | BODY MASS INDEX: 23.72 KG/M2

## 2019-03-14 DIAGNOSIS — I10 ESSENTIAL HYPERTENSION: Primary | ICD-10-CM

## 2019-03-14 DIAGNOSIS — I45.10 RBBB: ICD-10-CM

## 2019-03-14 DIAGNOSIS — R06.02 SHORTNESS OF BREATH: ICD-10-CM

## 2019-03-14 DIAGNOSIS — K21.9 GASTROESOPHAGEAL REFLUX DISEASE, ESOPHAGITIS PRESENCE NOT SPECIFIED: ICD-10-CM

## 2019-03-14 PROCEDURE — 1101F PR PT FALLS ASSESS DOC 0-1 FALLS W/OUT INJ PAST YR: ICD-10-PCS | Mod: CPTII,S$GLB,, | Performed by: INTERNAL MEDICINE

## 2019-03-14 PROCEDURE — 1101F PT FALLS ASSESS-DOCD LE1/YR: CPT | Mod: CPTII,S$GLB,, | Performed by: INTERNAL MEDICINE

## 2019-03-14 PROCEDURE — 99205 PR OFFICE/OUTPT VISIT, NEW, LEVL V, 60-74 MIN: ICD-10-PCS | Mod: 25,S$GLB,, | Performed by: INTERNAL MEDICINE

## 2019-03-14 PROCEDURE — 3074F PR MOST RECENT SYSTOLIC BLOOD PRESSURE < 130 MM HG: ICD-10-PCS | Mod: CPTII,S$GLB,, | Performed by: INTERNAL MEDICINE

## 2019-03-14 PROCEDURE — 99205 OFFICE O/P NEW HI 60 MIN: CPT | Mod: 25,S$GLB,, | Performed by: INTERNAL MEDICINE

## 2019-03-14 PROCEDURE — 3079F DIAST BP 80-89 MM HG: CPT | Mod: CPTII,S$GLB,, | Performed by: INTERNAL MEDICINE

## 2019-03-14 PROCEDURE — 3079F PR MOST RECENT DIASTOLIC BLOOD PRESSURE 80-89 MM HG: ICD-10-PCS | Mod: CPTII,S$GLB,, | Performed by: INTERNAL MEDICINE

## 2019-03-14 PROCEDURE — 99999 PR PBB SHADOW E&M-EST. PATIENT-LVL III: ICD-10-PCS | Mod: PBBFAC,,, | Performed by: INTERNAL MEDICINE

## 2019-03-14 PROCEDURE — 99999 PR PBB SHADOW E&M-EST. PATIENT-LVL III: CPT | Mod: PBBFAC,,, | Performed by: INTERNAL MEDICINE

## 2019-03-14 PROCEDURE — 3074F SYST BP LT 130 MM HG: CPT | Mod: CPTII,S$GLB,, | Performed by: INTERNAL MEDICINE

## 2019-03-14 PROCEDURE — 93000 ELECTROCARDIOGRAM COMPLETE: CPT | Mod: S$GLB,,, | Performed by: INTERNAL MEDICINE

## 2019-03-14 PROCEDURE — 93000 EKG 12-LEAD: ICD-10-PCS | Mod: S$GLB,,, | Performed by: INTERNAL MEDICINE

## 2019-03-14 RX ORDER — NEOMYCIN SULFATE, POLYMYXIN B SULFATE AND DEXAMETHASONE 3.5; 10000; 1 MG/ML; [USP'U]/ML; MG/ML
SUSPENSION/ DROPS OPHTHALMIC
Refills: 0 | COMMUNITY
Start: 2019-02-26 | End: 2020-12-02

## 2019-03-14 RX ORDER — CHOLECALCIFEROL (VITAMIN D3) 25 MCG
1000 TABLET ORAL DAILY
COMMUNITY

## 2019-03-14 NOTE — PROGRESS NOTES
Information faxed to [facility] - they will call patient to schedule an appointment with Dr Naila Schulz.

## 2019-03-14 NOTE — PROGRESS NOTES
"  Subjective:      Patient ID: Erasmo Fitch is a 73 y.o. male.    Chief Complaint: Shortness of Breath and Extremity Weakness    HPI:  Pt was diagnosed with coronary bridging and saw me for a second opinion about 5 years ago.  "I am constantly short of breath all the time."  "My legs are very weak."  Pt was admitted to [facility] October 17, 2018 with symptoms of chest pain and shortness of breath. Pt was diagnosed with anxiety.  Pt takes pantoprazole for acid reflux.    Pt had a Lexiscan Cardiolite stress test 10/17/18 which was normal.    Pt has persistent shortness of breath.    Pt feels tired and exhausted  Legs are very weak.  Pt is concerned about the circulation to his legs.    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    Pt is able to walk for 54 minutes most days on treadmill at 3 to 3.3 miles per hour, although he is short of breath while doing it    Review of Systems   Cardiovascular: Positive for chest pain and dyspnea on exertion. Negative for claudication, irregular heartbeat, leg swelling, near-syncope, orthopnea, palpitations and syncope.      Pt states he had abnormal PFT's years ago (ordered by Dr Howard)    Dr Hummel has done EGD within the past year and pt is up to date with colonoscopy as well  Past Medical History:   Diagnosis Date    Basal cell carcinoma of forearm 2003    Basal cell carcinoma of forehead 2013    Cancer     GERD (gastroesophageal reflux disease)     Headache(784.0)     Hypertension     Retroperitoneal bleed 1980    Right bundle branch block     Syncope and collapse     Many years ago while on a ladder hanging [facility]. "I got hot"        Past Surgical History:   Procedure Laterality Date    cervical spine fusion      DEBORA-TRANSFORAMINAL N/A 12/9/2015    Performed by [facility] Diagnostic Provider at [facility] CATH LAB    HIP FRACTURE SURGERY Right 1993    INJECTION-FACET " Bilateral 4/27/2016    Performed by [facility]a [facility] at [facility] PAIN MGT    Left shoulder surgery      SHOULDER ARTHROSCOPY W/ ROTATOR CUFF REPAIR Right 2014    SHOULDER ARTHROSCOPY W/ ROTATOR CUFF REPAIR Left 2013    TRANSURETHRAL RESECTION OF PROSTATE N/A 2014       Family History   Problem Relation Age of Onset    Heart disease Father     Heart attack Father     Heart disease Brother     Heart attack Brother        Social History     Socioeconomic History    Marital status:      Spouse name: None    Number of children: None    Years of education: None    Highest education level: None   Social Needs    Financial resource strain: None    Food insecurity - worry: None    Food insecurity - inability: None    Transportation needs - medical: None    Transportation needs - non-medical: None   Occupational History    None   Tobacco Use    Smoking status: Never Smoker    Smokeless tobacco: Never Used   Substance and Sexual Activity    Alcohol use: Yes     Alcohol/week: 3.6 oz     Types: 6 Cans of beer per week     Comment: occ.    Drug use: No    Sexual activity: None   Other Topics Concern    None   Social History Narrative    None       Current Outpatient Medications on File Prior to Visit   Medication Sig Dispense Refill    acetaminophen-codeine 300-30mg (TYLENOL #3) 300-30 mg Tab Take 1 tablet by mouth 3 (three) times daily as needed.  1    aspirin (ECOTRIN) 81 MG EC tablet Take 81 mg by mouth once daily.      folic acid/multivit-min/lutein (CENTRUM SILVER ORAL) Take by mouth.      ibuprofen (ADVIL,MOTRIN) 800 MG tablet 800 mg every meal as needed.       neomycin-polymyxin-dexamethasone (MAXITROL) 3.5mg/mL-10,000 unit/mL-0.1 % DrpS INSTILL 1 DROP INTO BOTH EYES 3 TIMES A DAY  0    pantoprazole (PROTONIX) 40 MG tablet 2 (two) times daily.       triamterene-hydrochlorothiazide 37.5-25 mg (DYAZIDE) 37.5-25 mg per capsule       vitamin D (VITAMIN D3) 1000 units Tab Take 2,000  "Units by mouth once daily.      [DISCONTINUED] azithromycin (Z-RYNE) 250 MG tablet TAKE 2 TABLETS BY MOUTH TODAY, THEN TAKE 1 TABLET DAILY FOR 4 DAYS  0    [DISCONTINUED] betamethasone valerate 0.1% (VALISONE) 0.1 % Lotn 2 (two) times daily.   2    [DISCONTINUED] calcium carb and citrate-vitD3 (CITRACAL + D SLOW RELEASE) 600 mg calcium- 500 unit TbSR Take 1 tablet by mouth 2 (two) times daily. 60 tablet 11    [DISCONTINUED] cephALEXin (KEFLEX) 500 MG capsule       [DISCONTINUED] diazepam (VALIUM) 5 MG tablet TAKE 1 TABLET BY MOUTH 30 MINUTES PRIOR TO MRI  0    [DISCONTINUED] gabapentin (NEURONTIN) 100 MG capsule Take 100 mg by mouth 2 (two) times daily.  2    [DISCONTINUED] hydrocodone-acetaminophen 5-325mg (NORCO) 5-325 mg per tablet 1 tablet.       [DISCONTINUED] meclizine (ANTIVERT) 25 mg tablet 2 (two) times daily as needed.       [DISCONTINUED] methocarbamol (ROBAXIN) 500 MG Tab Take 500 mg by mouth 2 (two) times daily as needed.      [DISCONTINUED] promethazine-codeine 6.25-10 mg/5 ml (PHENERGAN WITH CODEINE) 6.25-10 mg/5 mL syrup every 6 (six) hours as needed.   0    [DISCONTINUED] sertraline (ZOLOFT) 25 MG tablet Take 25 mg by mouth once daily.  6    [DISCONTINUED] tizanidine (ZANAFLEX) 4 MG tablet Take 4 mg by mouth 2 (two) times daily as needed.      [DISCONTINUED] triamcinolone acetonide 0.1% (KENALOG) 0.1 % cream        No current facility-administered medications on file prior to visit.        Review of patient's allergies indicates:   Allergen Reactions    Ciprofloxacin (bulk)     Seconal [secobarbital sodium] Other (See Comments)     hallucinations     Objective:     Vitals:    03/14/19 1013   BP: 121/82   BP Location: Left arm   Patient Position: Sitting   BP Method: Large (Automatic)   Pulse: 64   Weight: 76.9 kg (169 lb 6.8 oz)   Height: 5' 10.5" (1.791 m)        Physical Exam   Constitutional: He is oriented to person, place, and time. He appears well-developed and well-nourished. No " distress.   Eyes: No scleral icterus.   Neck: No JVD present. Carotid bruit is not present.   Cardiovascular: Regular rhythm and normal heart sounds. Exam reveals no gallop and no friction rub.   No murmur heard.  Pulses:       Dorsalis pedis pulses are 2+ on the right side, and 2+ on the left side.        Posterior tibial pulses are 2+ on the right side, and 2+ on the left side.   Pulmonary/Chest: Effort normal. No respiratory distress. He has rales in the right lower field and the left lower field.   Musculoskeletal: He exhibits no edema.   Neurological: He is alert and oriented to person, place, and time.   Skin: Skin is warm and dry. He is not diaphoretic.   Psychiatric: He has a normal mood and affect. His behavior is normal. Judgment and thought content normal.   Vitals reviewed.     ECG: sinus bradycardia, RBBB  Assessment:     1. Essential hypertension    2. Gastroesophageal reflux disease, esophagitis presence not specified    3. Shortness of breath    4. RBBB      Plan:   Erasmo was seen today for shortness of breath and extremity weakness.    Diagnoses and all orders for this visit:    Essential hypertension  -     TSH; Future  -     Lipid panel; Future  -     Comprehensive metabolic panel; Future    Gastroesophageal reflux disease, esophagitis presence not specified    Shortness of breath  -     IN OFFICE EKG 12-LEAD (to Muse)  -     X-Ray Chest PA And Lateral; Future  -     Transthoracic echo (TTE) complete (Cupid Only); Future  -     Complete PFT with bronchodilator; Future  -     Hemoglobin; Future  -     Brain natriuretic peptide; Future  -     TSH; Future  -     CBC auto differential; Future  -     Ambulatory consult to Pulmonology    RBBB     Consult Dr Naila Schulz for possible lung disease as cause of shortness of breath (pt has a hx of exposure to smoke at work and has velcro crackles at bases).    Consult Dr Marcus Hollins for leg weakness (discussed with pt and wife that he may have leg weakness  secondary to his cervical spine disease)    The chest pain is due to GERD (pt has Snell's esophagus)    CXR     Echocardiogram with doppler    Will review records from [facility]    Complete PFT's    Check lab    RTC after above    Follow-up in about 4 weeks (around 4/11/2019).

## 2019-03-25 ENCOUNTER — LAB VISIT (OUTPATIENT)
Dept: LAB | Facility: HOSPITAL | Age: 74
End: 2019-03-25
Attending: INTERNAL MEDICINE
Payer: MEDICARE

## 2019-03-25 DIAGNOSIS — R06.02 SHORTNESS OF BREATH: ICD-10-CM

## 2019-03-25 DIAGNOSIS — I10 ESSENTIAL HYPERTENSION: ICD-10-CM

## 2019-03-25 LAB
ALBUMIN SERPL BCP-MCNC: 4 G/DL (ref 3.5–5.2)
ALP SERPL-CCNC: 83 U/L (ref 55–135)
ALT SERPL W/O P-5'-P-CCNC: 23 U/L (ref 10–44)
ANION GAP SERPL CALC-SCNC: 6 MMOL/L (ref 8–16)
AST SERPL-CCNC: 25 U/L (ref 10–40)
BASOPHILS # BLD AUTO: 0.03 K/UL (ref 0–0.2)
BASOPHILS NFR BLD: 0.4 % (ref 0–1.9)
BILIRUB SERPL-MCNC: 0.9 MG/DL (ref 0.1–1)
BNP SERPL-MCNC: 59 PG/ML (ref 0–99)
BUN SERPL-MCNC: 15 MG/DL (ref 8–23)
CALCIUM SERPL-MCNC: 9.6 MG/DL (ref 8.7–10.5)
CHLORIDE SERPL-SCNC: 106 MMOL/L (ref 95–110)
CHOLEST SERPL-MCNC: 147 MG/DL (ref 120–199)
CHOLEST/HDLC SERPL: 2.9 {RATIO} (ref 2–5)
CO2 SERPL-SCNC: 31 MMOL/L (ref 23–29)
CREAT SERPL-MCNC: 0.9 MG/DL (ref 0.5–1.4)
DIFFERENTIAL METHOD: ABNORMAL
EOSINOPHIL # BLD AUTO: 0.3 K/UL (ref 0–0.5)
EOSINOPHIL NFR BLD: 3.6 % (ref 0–8)
ERYTHROCYTE [DISTWIDTH] IN BLOOD BY AUTOMATED COUNT: 13.6 % (ref 11.5–14.5)
EST. GFR  (AFRICAN AMERICAN): >60 ML/MIN/1.73 M^2
EST. GFR  (NON AFRICAN AMERICAN): >60 ML/MIN/1.73 M^2
GLUCOSE SERPL-MCNC: 92 MG/DL (ref 70–110)
HCT VFR BLD AUTO: 44.3 % (ref 40–54)
HDLC SERPL-MCNC: 51 MG/DL (ref 40–75)
HDLC SERPL: 34.7 % (ref 20–50)
HGB BLD-MCNC: 14.7 G/DL (ref 14–18)
HGB BLD-MCNC: 14.7 G/DL (ref 14–18)
LDLC SERPL CALC-MCNC: 80.6 MG/DL (ref 63–159)
LYMPHOCYTES # BLD AUTO: 2.5 K/UL (ref 1–4.8)
LYMPHOCYTES NFR BLD: 34.4 % (ref 18–48)
MCH RBC QN AUTO: 30.5 PG (ref 27–31)
MCHC RBC AUTO-ENTMCNC: 33.2 G/DL (ref 32–36)
MCV RBC AUTO: 92 FL (ref 82–98)
MONOCYTES # BLD AUTO: 1.1 K/UL (ref 0.3–1)
MONOCYTES NFR BLD: 14.7 % (ref 4–15)
NEUTROPHILS # BLD AUTO: 3.4 K/UL (ref 1.8–7.7)
NEUTROPHILS NFR BLD: 46.8 % (ref 38–73)
NONHDLC SERPL-MCNC: 96 MG/DL
PLATELET # BLD AUTO: 188 K/UL (ref 150–350)
PMV BLD AUTO: 12.4 FL (ref 9.2–12.9)
POTASSIUM SERPL-SCNC: 3.7 MMOL/L (ref 3.5–5.1)
PROT SERPL-MCNC: 6.7 G/DL (ref 6–8.4)
RBC # BLD AUTO: 4.82 M/UL (ref 4.6–6.2)
SODIUM SERPL-SCNC: 143 MMOL/L (ref 136–145)
TRIGL SERPL-MCNC: 77 MG/DL (ref 30–150)
TSH SERPL DL<=0.005 MIU/L-ACNC: 1.2 UIU/ML (ref 0.4–4)
WBC # BLD AUTO: 7.26 K/UL (ref 3.9–12.7)

## 2019-03-25 PROCEDURE — 36415 COLL VENOUS BLD VENIPUNCTURE: CPT

## 2019-03-25 PROCEDURE — 84443 ASSAY THYROID STIM HORMONE: CPT

## 2019-03-25 PROCEDURE — 83880 ASSAY OF NATRIURETIC PEPTIDE: CPT

## 2019-03-25 PROCEDURE — 80061 LIPID PANEL: CPT

## 2019-03-25 PROCEDURE — 85025 COMPLETE CBC W/AUTO DIFF WBC: CPT

## 2019-03-25 PROCEDURE — 80053 COMPREHEN METABOLIC PANEL: CPT

## 2019-03-27 ENCOUNTER — HOSPITAL ENCOUNTER (OUTPATIENT)
Dept: RADIOLOGY | Facility: HOSPITAL | Age: 74
Discharge: HOME | End: 2019-03-27
Attending: INTERNAL MEDICINE
Payer: MEDICARE

## 2019-03-27 ENCOUNTER — HOSPITAL ENCOUNTER (OUTPATIENT)
Dept: CARDIOLOGY | Facility: HOSPITAL | Age: 74
Discharge: HOME | End: 2019-03-27
Attending: INTERNAL MEDICINE
Payer: MEDICARE

## 2019-03-27 ENCOUNTER — HOSPITAL ENCOUNTER (OUTPATIENT)
Dept: PULMONOLOGY | Facility: HOSPITAL | Age: 74
Discharge: HOME | End: 2019-03-27
Attending: INTERNAL MEDICINE
Payer: MEDICARE

## 2019-03-27 DIAGNOSIS — R06.02 SHORTNESS OF BREATH: ICD-10-CM

## 2019-03-27 LAB
AORTIC ROOT ANNULUS: 2.95 CM
AORTIC VALVE CUSP SEPERATION: 0.97 CM
AV INDEX (PROSTH): 0.75
AV MEAN GRADIENT: 4.81 MMHG
AV PEAK GRADIENT: 8.64 MMHG
AV VALVE AREA: 2.78 CM2
AV VELOCITY RATIO: 0.7
CV ECHO LV RWT: 0.49 CM
DOP CALC AO PEAK VEL: 1.47 M/S
DOP CALC AO VTI: 32.74 CM
DOP CALC LVOT AREA: 3.7 CM2
DOP CALC LVOT DIAMETER: 2.17 CM
DOP CALC LVOT PEAK VEL: 1.02 M/S
DOP CALC LVOT STROKE VOLUME: 91.01 CM3
DOP CALCLVOT PEAK VEL VTI: 24.62 CM
E WAVE DECELERATION TIME: 292.01 MSEC
E/A RATIO: 0.88
ECHO LV POSTERIOR WALL: 1.09 CM (ref 0.6–1.1)
FRACTIONAL SHORTENING: 29 % (ref 28–44)
INTERVENTRICULAR SEPTUM: 1.09 CM (ref 0.6–1.1)
LA MAJOR: 5.65 CM
LA MINOR: 5.55 CM
LA WIDTH: 4.34 CM
LEFT ATRIUM SIZE: 3.97 CM
LEFT ATRIUM VOLUME: 82.01 CM3
LEFT INTERNAL DIMENSION IN SYSTOLE: 3.14 CM (ref 2.1–4)
LEFT VENTRICLE DIASTOLIC VOLUME: 88.55 ML
LEFT VENTRICLE SYSTOLIC VOLUME: 39.22 ML
LEFT VENTRICULAR INTERNAL DIMENSION IN DIASTOLE: 4.42 CM (ref 3.5–6)
LEFT VENTRICULAR MASS: 167.95 G
MV PEAK A VEL: 0.66 M/S
MV PEAK E VEL: 0.58 M/S
PISA TR MAX VEL: 2.11 M/S
PULM VEIN S/D RATIO: 2
PV PEAK D VEL: 0.32 M/S
PV PEAK S VEL: 0.64 M/S
PV PEAK VELOCITY: 0.83 CM/S
RA MAJOR: 5.23 CM
RA PRESSURE: 3 MMHG
RIGHT VENTRICULAR END-DIASTOLIC DIMENSION: 3.73 CM
TR MAX PG: 17.81 MMHG
TV REST PULMONARY ARTERY PRESSURE: 21 MMHG

## 2019-03-27 PROCEDURE — 94729 DIFFUSING CAPACITY: CPT

## 2019-03-27 PROCEDURE — 71046 X-RAY EXAM CHEST 2 VIEWS: CPT | Mod: TC,FY

## 2019-03-27 PROCEDURE — 93306 TRANSTHORACIC ECHO (TTE) COMPLETE (CUPID ONLY): ICD-10-PCS | Mod: 26,,, | Performed by: INTERNAL MEDICINE

## 2019-03-27 PROCEDURE — 93306 TTE W/DOPPLER COMPLETE: CPT

## 2019-03-27 PROCEDURE — 94200 LUNG FUNCTION TEST (MBC/MVV): CPT

## 2019-03-27 PROCEDURE — 71046 X-RAY EXAM CHEST 2 VIEWS: CPT | Mod: 26,,, | Performed by: RADIOLOGY

## 2019-03-27 PROCEDURE — 71046 XR CHEST PA AND LATERAL: ICD-10-PCS | Mod: 26,,, | Performed by: RADIOLOGY

## 2019-03-27 PROCEDURE — 94010 BREATHING CAPACITY TEST: CPT

## 2019-03-27 PROCEDURE — 93306 TTE W/DOPPLER COMPLETE: CPT | Mod: 26,,, | Performed by: INTERNAL MEDICINE

## 2019-03-27 PROCEDURE — 94727 GAS DIL/WSHOT DETER LNG VOL: CPT

## 2019-03-28 ENCOUNTER — TELEPHONE (OUTPATIENT)
Dept: CARDIOLOGY | Facility: CLINIC | Age: 74
End: 2019-03-28

## 2019-03-28 LAB
DLCO ADJ PRE: 18.43 ML/(MIN*MMHG) (ref 19.34–33.19)
DLCO SINGLE BREATH LLN: 19.34
DLCO SINGLE BREATH PRE REF: 70.2 %
DLCO SINGLE BREATH REF: 26.26
DLCOC SBVA LLN: 2.54
DLCOC SBVA PRE REF: 85.6 %
DLCOC SBVA REF: 3.68
DLCOC SINGLE BREATH LLN: 19.34
DLCOC SINGLE BREATH PRE REF: 70.2 %
DLCOC SINGLE BREATH REF: 26.26
DLCOVA LLN: 2.54
DLCOVA PRE REF: 85.6 %
DLCOVA PRE: 3.15 ML/(MIN*MMHG*L) (ref 2.54–4.82)
DLCOVA REF: 3.68
DLVAADJ PRE: 3.15 ML/(MIN*MMHG*L) (ref 2.54–4.82)
ERV LLN: 1
ERV REF: 1
ERVN2 LLN: 1
ERVN2 PRE REF: 51.2 %
ERVN2 PRE: 0.51 L (ref 1–1)
ERVN2 REF: 1
FEF 25 75 LLN: 0.89
FEF 25 75 PRE REF: 59.6 %
FEF 25 75 REF: 2.2
FEV1 FVC LLN: 61
FEV1 FVC PRE REF: 95 %
FEV1 FVC REF: 75
FEV1 LLN: 2.14
FEV1 PRE REF: 88.2 %
FEV1 REF: 3.03
FEV6 LLN: 3.08
FEV6 PRE REF: 91 %
FEV6 PRE: 3.63 L (ref 3.08–4.89)
FEV6 REF: 3.99
FRCN2 LLN: 2.76
FRCN2 PRE REF: 104.5 %
FRCN2 REF: 3.75
FRCPLETH LLN: 2.76
FRCPLETH REF: 3.75
FVC LLN: 2.96
FVC PRE REF: 92.3 %
FVC REF: 4.05
IVC PRE: 3.59 L (ref 2.96–5.15)
IVC SINGLE BREATH LLN: 2.96
IVC SINGLE BREATH PRE REF: 88.7 %
IVC SINGLE BREATH REF: 4.05
MVV LLN: 102
MVV REF: 120
PEF LLN: 5.53
PEF PRE REF: 85.4 %
PEF REF: 7.85
PRE DLCO: 18.43 ML/(MIN*MMHG) (ref 19.34–33.19)
PRE FEF 25 75: 1.31 L/S (ref 0.89–3.51)
PRE FET 100: 7.23 SEC
PRE FEV1 FVC: 71.39 % (ref 61.02–89.35)
PRE FEV1: 2.67 L (ref 2.14–3.92)
PRE FRC N2: 3.92 L
PRE FVC: 3.74 L (ref 2.96–5.15)
PRE PEF: 6.7 L/S (ref 5.53–10.17)
RAW LLN: 3.06
RAW REF: 3.06
RV LLN: 2.08
RV REF: 2.75
RVN2 LLN: 2.08
RVN2 PRE REF: 92 %
RVN2 PRE: 2.53 L (ref 2.08–3.43)
RVN2 REF: 2.75
RVN2TLCN2 LLN: 34.23
RVN2TLCN2 PRE REF: 90 %
RVN2TLCN2 PRE: 38.89 % (ref 34.23–52.19)
RVN2TLCN2 REF: 43.21
RVTLC LLN: 34
RVTLC REF: 43
TLC LLN: 5.99
TLC REF: 7.14
TLCN2 LLN: 5.99
TLCN2 PRE REF: 91.1 %
TLCN2 PRE: 6.51 L (ref 5.99–8.29)
TLCN2 REF: 7.14
VA PRE: 5.86 L (ref 6.99–6.99)
VA SINGLE BREATH LLN: 6.99
VA SINGLE BREATH PRE REF: 83.8 %
VA SINGLE BREATH REF: 6.99
VC LLN: 2.96
VC REF: 4.05
VCMAXN2 LLN: 2.96
VCMAXN2 PRE REF: 98.1 %
VCMAXN2 PRE: 3.98 L (ref 2.96–5.15)
VCMAXN2 REF: 4.05

## 2019-03-28 NOTE — TELEPHONE ENCOUNTER
I reviewed recent tests with pt:  Echocardiogram OK.   ( Mild relaxation abnormality noted)  CXR OK  BNP WNL  CBC OK  Lipids OK  PFT's with sl decreased DLCO.  Pt will f/u with Dr Naila Schulz.  Pt reassured

## 2019-04-16 ENCOUNTER — OFFICE VISIT (OUTPATIENT)
Dept: CARDIOLOGY | Facility: CLINIC | Age: 74
End: 2019-04-16
Payer: MEDICARE

## 2019-04-16 VITALS
HEIGHT: 71 IN | WEIGHT: 172.5 LBS | DIASTOLIC BLOOD PRESSURE: 80 MMHG | SYSTOLIC BLOOD PRESSURE: 116 MMHG | BODY MASS INDEX: 24.15 KG/M2 | HEART RATE: 67 BPM

## 2019-04-16 DIAGNOSIS — I45.10 RBBB: ICD-10-CM

## 2019-04-16 DIAGNOSIS — R06.02 SHORTNESS OF BREATH: ICD-10-CM

## 2019-04-16 DIAGNOSIS — I10 ESSENTIAL HYPERTENSION: Primary | ICD-10-CM

## 2019-04-16 PROCEDURE — 99999 PR PBB SHADOW E&M-EST. PATIENT-LVL II: ICD-10-PCS | Mod: PBBFAC,,, | Performed by: INTERNAL MEDICINE

## 2019-04-16 PROCEDURE — 3079F DIAST BP 80-89 MM HG: CPT | Mod: CPTII,S$GLB,, | Performed by: INTERNAL MEDICINE

## 2019-04-16 PROCEDURE — 99213 PR OFFICE/OUTPT VISIT, EST, LEVL III, 20-29 MIN: ICD-10-PCS | Mod: S$GLB,,, | Performed by: INTERNAL MEDICINE

## 2019-04-16 PROCEDURE — 1101F PT FALLS ASSESS-DOCD LE1/YR: CPT | Mod: CPTII,S$GLB,, | Performed by: INTERNAL MEDICINE

## 2019-04-16 PROCEDURE — 99999 PR PBB SHADOW E&M-EST. PATIENT-LVL II: CPT | Mod: PBBFAC,,, | Performed by: INTERNAL MEDICINE

## 2019-04-16 PROCEDURE — 1101F PR PT FALLS ASSESS DOC 0-1 FALLS W/OUT INJ PAST YR: ICD-10-PCS | Mod: CPTII,S$GLB,, | Performed by: INTERNAL MEDICINE

## 2019-04-16 PROCEDURE — 3079F PR MOST RECENT DIASTOLIC BLOOD PRESSURE 80-89 MM HG: ICD-10-PCS | Mod: CPTII,S$GLB,, | Performed by: INTERNAL MEDICINE

## 2019-04-16 PROCEDURE — 3074F SYST BP LT 130 MM HG: CPT | Mod: CPTII,S$GLB,, | Performed by: INTERNAL MEDICINE

## 2019-04-16 PROCEDURE — 99213 OFFICE O/P EST LOW 20 MIN: CPT | Mod: S$GLB,,, | Performed by: INTERNAL MEDICINE

## 2019-04-16 PROCEDURE — 3074F PR MOST RECENT SYSTOLIC BLOOD PRESSURE < 130 MM HG: ICD-10-PCS | Mod: CPTII,S$GLB,, | Performed by: INTERNAL MEDICINE

## 2019-04-16 NOTE — PROGRESS NOTES
"  Subjective:      Patient ID: Erasmo Fitch is a 73 y.o. male.    Chief Complaint: Follow-up    HPI:  Pt saw Dr Schulz who did not find significant lung disease.     Pt feel better since walking on treadmill daily    Pt has an appt with a neurologist regarding tired legs and prior neck fusion    Review of Systems   Cardiovascular: Positive for dyspnea on exertion (very mild and chronic and overall improved over the past couple of months with daily treadmill exercise.). Negative for chest pain, claudication, irregular heartbeat, leg swelling, near-syncope, orthopnea, palpitations and syncope.      Only takes ibuprofen as needed  Past Medical History:   Diagnosis Date    Basal cell carcinoma of forearm 2003    Basal cell carcinoma of forehead 2013    Cancer     GERD (gastroesophageal reflux disease)     Headache(784.0)     Hypertension     Retroperitoneal bleed 1980    Right bundle branch block     Syncope and collapse     Many years ago while on a ladder hanging sheetrock. "I got hot"        Past Surgical History:   Procedure Laterality Date    cervical spine fusion      DEBORA-TRANSFORAMINAL N/A 12/9/2015    Performed by [facility] Diagnostic Provider at [facility] CATH LAB    HIP FRACTURE SURGERY Right 1993    INJECTION-FACET Bilateral 4/27/2016    Performed by Romaine Guevara MD at [facility] PAIN MGT    Left shoulder surgery      SHOULDER ARTHROSCOPY W/ ROTATOR CUFF REPAIR Right 2014    SHOULDER ARTHROSCOPY W/ ROTATOR CUFF REPAIR Left 2013    TRANSURETHRAL RESECTION OF PROSTATE N/A 2014       Family History   Problem Relation Age of Onset    Heart disease Father     Heart attack Father     Heart disease Brother     Heart attack Brother        Social History     Socioeconomic History    Marital status:      Spouse name: Not on file    Number of children: Not on file    Years of education: Not on file    Highest education level: Not on file   Occupational History    Not on file   Social Needs    " Financial resource strain: Not on file    Food insecurity:     Worry: Not on file     Inability: Not on file    Transportation needs:     Medical: Not on file     Non-medical: Not on file   Tobacco Use    Smoking status: Never Smoker    Smokeless tobacco: Never Used   Substance and Sexual Activity    Alcohol use: Yes     Alcohol/week: 3.6 oz     Types: 6 Cans of beer per week     Comment: occ.    Drug use: No    Sexual activity: Not on file   Lifestyle    Physical activity:     Days per week: Not on file     Minutes per session: Not on file    Stress: Not on file   Relationships    Social connections:     Talks on phone: Not on file     Gets together: Not on file     Attends Rastafarian service: Not on file     Active member of club or organization: Not on file     Attends meetings of clubs or organizations: Not on file     Relationship status: Not on file   Other Topics Concern    Not on file   Social History Narrative    Not on file       Current Outpatient Medications on File Prior to Visit   Medication Sig Dispense Refill    acetaminophen-codeine 300-30mg (TYLENOL #3) 300-30 mg Tab Take 1 tablet by mouth 3 (three) times daily as needed.  1    aspirin (ECOTRIN) 81 MG EC tablet Take 81 mg by mouth once daily.      folic acid/multivit-min/lutein (CENTRUM SILVER ORAL) Take by mouth.      ibuprofen (ADVIL,MOTRIN) 800 MG tablet 800 mg every meal as needed.       neomycin-polymyxin-dexamethasone (MAXITROL) 3.5mg/mL-10,000 unit/mL-0.1 % DrpS INSTILL 1 DROP INTO BOTH EYES 3 TIMES A DAY  0    pantoprazole (PROTONIX) 40 MG tablet 2 (two) times daily.       triamterene-hydrochlorothiazide 37.5-25 mg (DYAZIDE) 37.5-25 mg per capsule       vitamin D (VITAMIN D3) 1000 units Tab Take 2,000 Units by mouth once daily.       No current facility-administered medications on file prior to visit.        Review of patient's allergies indicates:   Allergen Reactions    Ciprofloxacin (bulk)     Seconal [secobarbital  "sodium] Other (See Comments)     hallucinations     Objective:     Vitals:    04/16/19 1319   BP: 116/80   BP Location: Left arm   Patient Position: Sitting   BP Method: Large (Automatic)   Pulse: 67   Weight: 78.3 kg (172 lb 8.2 oz)   Height: 5' 10.5" (1.791 m)        Physical Exam   Constitutional: He is oriented to person, place, and time. He appears well-developed and well-nourished. No distress.   Eyes: No scleral icterus.   Neck: No JVD present. Carotid bruit is not present.   Cardiovascular: Regular rhythm and normal heart sounds. Exam reveals no gallop and no friction rub.   No murmur heard.  Pulmonary/Chest: Effort normal. No respiratory distress. He has rales (few crackles at bases).   Musculoskeletal: He exhibits no edema.   Neurological: He is alert and oriented to person, place, and time.   Skin: Skin is warm and dry. He is not diaphoretic.   Psychiatric: He has a normal mood and affect. His behavior is normal. Judgment and thought content normal.   Vitals reviewed.     CXR OK  Echocardiogram showed mild diastolic dysfunction    CBC and CMP and TSH and BNP and lipids WNL    Note Cardiolite stress test 10/18 WNL  Assessment:     1. Essential hypertension    2. RBBB    3. Shortness of breath    4.      Hx of myocardial bridging  Plan:   Erasmo was seen today for follow-up.    Diagnoses and all orders for this visit:    Essential hypertension    RBBB    Shortness of breath     The shortness of breath may have been due to deconditioning and has improved with exercise.  Pt's minimal diastolic dysfunction is unlikely to be contributing to shortness of breath.  Pt's smoke exposure is unlikely to be a major contributor to pt's shortness of breath.    Follow up in about 1 year (around 4/16/2020).  "

## 2019-05-31 ENCOUNTER — TELEPHONE (OUTPATIENT)
Dept: NEUROSURGERY | Facility: CLINIC | Age: 74
End: 2019-05-31

## 2019-06-04 ENCOUNTER — OFFICE VISIT (OUTPATIENT)
Dept: NEUROSURGERY | Facility: CLINIC | Age: 74
End: 2019-06-04
Payer: MEDICARE

## 2019-06-04 ENCOUNTER — HOSPITAL ENCOUNTER (OUTPATIENT)
Dept: RADIOLOGY | Facility: HOSPITAL | Age: 74
Discharge: HOME | End: 2019-06-04
Attending: NEUROLOGICAL SURGERY
Payer: MEDICARE

## 2019-06-04 VITALS
WEIGHT: 172 LBS | HEART RATE: 63 BPM | BODY MASS INDEX: 24.08 KG/M2 | DIASTOLIC BLOOD PRESSURE: 82 MMHG | SYSTOLIC BLOOD PRESSURE: 122 MMHG | HEIGHT: 71 IN

## 2019-06-04 DIAGNOSIS — R15.9 INCONTINENCE OF FECES WITH FECAL URGENCY: ICD-10-CM

## 2019-06-04 DIAGNOSIS — M47.12 CERVICAL SPONDYLOSIS WITH MYELOPATHY: ICD-10-CM

## 2019-06-04 DIAGNOSIS — Z98.1 S/P CERVICAL SPINAL FUSION: ICD-10-CM

## 2019-06-04 DIAGNOSIS — S32.010S CLOSED COMPRESSION FRACTURE OF FIRST LUMBAR VERTEBRA, SEQUELA: ICD-10-CM

## 2019-06-04 DIAGNOSIS — Z98.1 S/P CERVICAL SPINAL FUSION: Primary | ICD-10-CM

## 2019-06-04 DIAGNOSIS — R15.2 INCONTINENCE OF FECES WITH FECAL URGENCY: ICD-10-CM

## 2019-06-04 PROCEDURE — 3079F DIAST BP 80-89 MM HG: CPT | Mod: CPTII,S$GLB,, | Performed by: NEUROLOGICAL SURGERY

## 2019-06-04 PROCEDURE — 72083 XR SCOLIOSIS COMPLETE: ICD-10-PCS | Mod: 26,,, | Performed by: RADIOLOGY

## 2019-06-04 PROCEDURE — 1101F PT FALLS ASSESS-DOCD LE1/YR: CPT | Mod: CPTII,S$GLB,, | Performed by: NEUROLOGICAL SURGERY

## 2019-06-04 PROCEDURE — 72082 X-RAY EXAM ENTIRE SPI 2/3 VW: CPT | Mod: TC,PN

## 2019-06-04 PROCEDURE — 3074F PR MOST RECENT SYSTOLIC BLOOD PRESSURE < 130 MM HG: ICD-10-PCS | Mod: CPTII,S$GLB,, | Performed by: NEUROLOGICAL SURGERY

## 2019-06-04 PROCEDURE — 72120 X-RAY BEND ONLY L-S SPINE: CPT | Mod: TC,PN

## 2019-06-04 PROCEDURE — 72040 XR CERVICAL SPINE FLEXION  AND EXTENSION ONLY: ICD-10-PCS | Mod: 26,,, | Performed by: RADIOLOGY

## 2019-06-04 PROCEDURE — 3074F SYST BP LT 130 MM HG: CPT | Mod: CPTII,S$GLB,, | Performed by: NEUROLOGICAL SURGERY

## 2019-06-04 PROCEDURE — 72040 X-RAY EXAM NECK SPINE 2-3 VW: CPT | Mod: TC,PN

## 2019-06-04 PROCEDURE — 72083 X-RAY EXAM ENTIRE SPI 4/5 VW: CPT | Mod: 26,,, | Performed by: RADIOLOGY

## 2019-06-04 PROCEDURE — 99213 OFFICE O/P EST LOW 20 MIN: CPT | Mod: S$GLB,,, | Performed by: NEUROLOGICAL SURGERY

## 2019-06-04 PROCEDURE — 3079F PR MOST RECENT DIASTOLIC BLOOD PRESSURE 80-89 MM HG: ICD-10-PCS | Mod: CPTII,S$GLB,, | Performed by: NEUROLOGICAL SURGERY

## 2019-06-04 PROCEDURE — 72040 X-RAY EXAM NECK SPINE 2-3 VW: CPT | Mod: 26,,, | Performed by: RADIOLOGY

## 2019-06-04 PROCEDURE — 99999 PR PBB SHADOW E&M-EST. PATIENT-LVL IV: ICD-10-PCS | Mod: PBBFAC,,, | Performed by: NEUROLOGICAL SURGERY

## 2019-06-04 PROCEDURE — 99999 PR PBB SHADOW E&M-EST. PATIENT-LVL IV: CPT | Mod: PBBFAC,,, | Performed by: NEUROLOGICAL SURGERY

## 2019-06-04 PROCEDURE — 1101F PR PT FALLS ASSESS DOC 0-1 FALLS W/OUT INJ PAST YR: ICD-10-PCS | Mod: CPTII,S$GLB,, | Performed by: NEUROLOGICAL SURGERY

## 2019-06-04 PROCEDURE — 99213 PR OFFICE/OUTPT VISIT, EST, LEVL III, 20-29 MIN: ICD-10-PCS | Mod: S$GLB,,, | Performed by: NEUROLOGICAL SURGERY

## 2019-06-04 NOTE — PROGRESS NOTES
NEUROSURGICAL PROGRESS NOTE    DATE OF SERVICE:  06/04/2019    ATTENDING PHYSICIAN:  Po Huff MD    SUBJECTIVE:    INTERIM HISTORY:    This is a very pleasant 73 y.o. male, who is status post C4-C7 ACDF in 2016.  Patient is complaining of worsening gait imbalance bilateral hand weakness.  He also has worsening neck pain.  He is not reporting arm or leg pain.  He reports occasional fecal incontinence.  He is scheduled to start physical therapy for his neck pain.  He has not done deep neck flexor strengthening exercises in the past.  Neck pain is worse when he uses the treadmill.  He has a tendency to lean forward.                PAST MEDICAL HISTORY:  Active Ambulatory Problems     Diagnosis Date Noted    Hypertension     Headache 11/08/2012    Neck pain 12/26/2013    Cervical spine disease 01/16/2014    Cervical stenosis of spinal canal 03/14/2014    Cervical spondylosis with myelopathy 03/14/2014    Cervical radiculopathy 10/24/2014    Acute prostatitis 01/20/2018    Gastroesophageal reflux disease 03/14/2019    Shortness of breath 03/14/2019    RBBB 03/14/2019     Resolved Ambulatory Problems     Diagnosis Date Noted    No Resolved Ambulatory Problems     Past Medical History:   Diagnosis Date    Basal cell carcinoma of forearm 2003    Basal cell carcinoma of forehead 2013    Cancer     GERD (gastroesophageal reflux disease)     Headache(784.0)     Hypertension     Retroperitoneal bleed 1980    Right bundle branch block     Syncope and collapse        PAST SURGICAL HISTORY:  Past Surgical History:   Procedure Laterality Date    cervical spine fusion      DEBORA-TRANSFORAMINAL N/A 12/9/2015    Performed by [facility] Provider at [facility] CATH LAB    HIP FRACTURE SURGERY Right 1993    INJECTION-FACET Bilateral 4/27/2016    Performed by [facility]ue[facility]a MD at [facility] PAIN MGT    Left shoulder surgery      SHOULDER ARTHROSCOPY W/ ROTATOR CUFF REPAIR Right 2014    SHOULDER ARTHROSCOPY W/  ROTATOR CUFF REPAIR Left 2013    TRANSURETHRAL RESECTION OF PROSTATE N/A 2014       SOCIAL HISTORY:   Social History     Socioeconomic History    Marital status:      Spouse name: Not on file    Number of children: Not on file    Years of education: Not on file    Highest education level: Not on file   Occupational History    Not on file   Social Needs    Financial resource strain: Not on file    Food insecurity:     Worry: Not on file     Inability: Not on file    Transportation needs:     Medical: Not on file     Non-medical: Not on file   Tobacco Use    Smoking status: Never Smoker    Smokeless tobacco: Never Used   Substance and Sexual Activity    Alcohol use: Yes     Alcohol/week: 3.6 oz     Types: 6 Cans of beer per week     Comment: occ.    Drug use: No    Sexual activity: Not on file   Lifestyle    Physical activity:     Days per week: Not on file     Minutes per session: Not on file    Stress: Not on file   Relationships    Social connections:     Talks on phone: Not on file     Gets together: Not on file     Attends Latter-day service: Not on file     Active member of club or organization: Not on file     Attends meetings of clubs or organizations: Not on file     Relationship status: Not on file   Other Topics Concern    Not on file   Social History Narrative    Not on file       FAMILY HISTORY:  Family History   Problem Relation Age of Onset    Heart disease Father     Heart attack Father     Heart disease Brother     Heart attack Brother        CURRENTS MEDICATIONS:  Current Outpatient Medications on File Prior to Visit   Medication Sig Dispense Refill    acetaminophen-codeine 300-30mg (TYLENOL #3) 300-30 mg Tab Take 1 tablet by mouth 3 (three) times daily as needed.  1    aspirin (ECOTRIN) 81 MG EC tablet Take 81 mg by mouth once daily.      folic acid/multivit-min/lutein (CENTRUM SILVER ORAL) Take by mouth.      ibuprofen (ADVIL,MOTRIN) 800 MG tablet 800 mg every meal  as needed.       neomycin-polymyxin-dexamethasone (MAXITROL) 3.5mg/mL-10,000 unit/mL-0.1 % DrpS INSTILL 1 DROP INTO BOTH EYES 3 TIMES A DAY  0    pantoprazole (PROTONIX) 40 MG tablet 2 (two) times daily.       triamterene-hydrochlorothiazide 37.5-25 mg (DYAZIDE) 37.5-25 mg per capsule       vitamin D (VITAMIN D3) 1000 units Tab Take 2,000 Units by mouth once daily.       No current facility-administered medications on file prior to visit.        ALLERGIES:  Review of patient's allergies indicates:   Allergen Reactions    Ciprofloxacin (bulk)     Seconal [secobarbital sodium] Other (See Comments)     hallucinations       REVIEW OF SYSTEMS:  Review of Systems   Constitutional: Negative for diaphoresis, fever and weight loss.   Respiratory: Negative for shortness of breath.    Cardiovascular: Negative for chest pain.   Gastrointestinal: Negative for blood in stool.   Genitourinary: Negative for hematuria.   Endo/Heme/Allergies: Does not bruise/bleed easily.   All other systems reviewed and are negative.        OBJECTIVE:    PHYSICAL EXAMINATION:   Vitals:    06/04/19 1016   BP: 122/82   Pulse: 63       Physical Exam:  Vitals reviewed.    Constitutional: He appears well-developed and well-nourished.     Eyes: Pupils are equal, round, and reactive to light. Conjunctivae and EOM are normal.     Cardiovascular: Normal distal pulses and no edema.     Abdominal: Soft.     Skin: Skin displays no rash on trunk and no rash on extremities. Skin displays no lesions on trunk and no lesions on extremities.     Psych/Behavior: He is alert. He is oriented to person, place, and time. He has a normal mood and affect.     Musculoskeletal:        Neck: Range of motion is limited. ROM severity is Impaired retropulsion.     Neurological:        DTRs: Tricep reflexes are 2+ on the right side and 2+ on the left side. Bicep reflexes are 2+ on the right side and 2+ on the left side. Brachioradialis reflexes are 2+ on the right side and  2+ on the left side. Patellar reflexes are 2+ on the right side and 2+ on the left side. Achilles reflexes are 2+ on the right side and 2+ on the left side.       Back Exam     Muscle Strength   Right Quadriceps:  5/5   Left Quadriceps:  5/5   Right Hamstrings:  5/5   Left Hamstrings:  5/5     Tests   Straight leg raise right: negative  Straight leg raise left: negative    Other   Toe walk: normal  Heel walk: normal                Neurologic Exam     Mental Status   Oriented to person, place, and time.   Speech: speech is normal   Level of consciousness: alert    Cranial Nerves   Cranial nerves II through XII intact.     CN III, IV, VI   Pupils are equal, round, and reactive to light.  Extraocular motions are normal.     Motor Exam   Muscle bulk: normal  Overall muscle tone: increased    Strength   Right deltoid: 5/5  Left deltoid: 5/5  Right biceps: 5/5  Left biceps: 5/5  Right triceps: 5/5  Left triceps: 5/5  Right wrist flexion: 5/5  Left wrist flexion: 5/5  Right wrist extension: 5/5  Left wrist extension: 5/5  Right interossei: 4/5  Left interossei: 4/5  Right iliopsoas: 5/5  Left iliopsoas: 5/5  Right quadriceps: 5/5  Left quadriceps: 5/5  Right hamstrin/5  Left hamstrin/5  Right anterior tibial: 5/5  Left anterior tibial: 5/5  Right posterior tibial: 5/5  Left posterior tibial: 5/5  Right peroneal: 5/5  Left peroneal: 5/5  Right gastroc: 5/5  Left gastroc: 5/5    Sensory Exam   Light touch normal.   Pinprick normal.     Gait, Coordination, and Reflexes     Gait  Gait: normal    Coordination   Finger to nose coordination: normal  Tandem walking coordination: abnormal    Reflexes   Right brachioradialis: 2+  Left brachioradialis: 2+  Right biceps: 2+  Left biceps: 2+  Right triceps: 2+  Left triceps: 2+  Right patellar: 2+  Left patellar: 2+  Right achilles: 2+  Left achilles: 2+  Right plantar: normal  Left plantar: normal  Right Newsome: absent  Left Newsome: absent  Right ankle clonus: absent  Left  ankle clonus: absent        DIAGNOSTIC DATA:  I personally interpreted the following imaging:   CT scan cervical spine November 2018 shows C4-C7 anterior cervical diskectomy and instrumented fusion.  Pseudoarthrosis with no interbody fusion at C4-5 and C5-6, consultation at fusion at C6-7.  MRI cervical spine November 2018 shows C4-C7 instrumented fusion, C3-4 spondylosis with mild spinal stenosis    ASSESMENT:  This is a 73 y.o. male with     Problem List Items Addressed This Visit        Neuro    Cervical spondylosis with myelopathy    Relevant Orders    MRI Cervical Spine Without Contrast    X-Ray Cervical Spine Flexion And Extension Only      Other Visit Diagnoses     S/P cervical spinal fusion    -  Primary    Relevant Orders    MRI Cervical Spine Without Contrast    X-Ray Cervical Spine Flexion And Extension Only    Closed compression fracture of first lumbar vertebra, sequela        Relevant Orders    MRI Thoracic Spine Without Contrast    MRI Lumbar Spine Without Contrast    X-Ray Scoliosis Complete Including Supine And Erect    X-Ray Lumbar Spine Flexion And Extension Only    Incontinence of feces with fecal urgency        Relevant Orders    MRI Thoracic Spine Without Contrast    MRI Lumbar Spine Without Contrast    X-Ray Scoliosis Complete Including Supine And Erect    X-Ray Lumbar Spine Flexion And Extension Only            PLAN:  Start cervical physical therapy with deep neck flexor strengthening exercises  Cervical spine MRI to rule out progression of adjacent level disease at C3-4  Thoracic and lumbar spine MRI to rule out stenosis causing gait imbalance or fecal incontinence  All questions answered  Will call back after the MRI          Po Huff MD  Cell:[phone]

## 2019-06-06 ENCOUNTER — TELEPHONE (OUTPATIENT)
Dept: NEUROSURGERY | Facility: CLINIC | Age: 74
End: 2019-06-06

## 2019-06-06 RX ORDER — DIAZEPAM 5 MG/1
5-10 TABLET ORAL ONCE AS NEEDED
Qty: 2 TABLET | Refills: 0 | Status: SHIPPED | OUTPATIENT
Start: 2019-06-06 | End: 2020-12-02

## 2019-06-06 NOTE — TELEPHONE ENCOUNTER
[place] would like to know if MD can prescribe script for Valium for his MRI.     Due to the IV sedation isn't available at [facility] - due to being under construction- per Francine    Please advise, patient is scheduled on 06/18/19

## 2019-06-06 NOTE — TELEPHONE ENCOUNTER
Spoke with Mrs. Ruiz- patients wife. Informed her MRI has to be at [facility] for IV sedation- unfortunately, per MsAlanis Francine- they aren't doing IV sedation at this time- due to reconstruction. Mrs. Ruiz will have Mr. Zimmerman contact clinic back.      MRI appt has been cancelled at [place].

## 2019-06-18 ENCOUNTER — HOSPITAL ENCOUNTER (OUTPATIENT)
Dept: RADIOLOGY | Facility: HOSPITAL | Age: 74
Discharge: HOME | End: 2019-06-18
Attending: NEUROLOGICAL SURGERY
Payer: MEDICARE

## 2019-06-18 DIAGNOSIS — S32.010S CLOSED COMPRESSION FRACTURE OF FIRST LUMBAR VERTEBRA, SEQUELA: ICD-10-CM

## 2019-06-18 DIAGNOSIS — R15.2 INCONTINENCE OF FECES WITH FECAL URGENCY: ICD-10-CM

## 2019-06-18 DIAGNOSIS — M47.12 CERVICAL SPONDYLOSIS WITH MYELOPATHY: ICD-10-CM

## 2019-06-18 DIAGNOSIS — Z98.1 S/P CERVICAL SPINAL FUSION: ICD-10-CM

## 2019-06-18 DIAGNOSIS — R15.9 INCONTINENCE OF FECES WITH FECAL URGENCY: ICD-10-CM

## 2019-06-18 PROCEDURE — 72141 MRI NECK SPINE W/O DYE: CPT | Mod: TC

## 2019-06-18 PROCEDURE — 72141 MRI NECK SPINE W/O DYE: CPT | Mod: 26,,, | Performed by: RADIOLOGY

## 2019-06-18 PROCEDURE — 72146 MRI CHEST SPINE W/O DYE: CPT | Mod: TC

## 2019-06-18 PROCEDURE — 72146 MRI CHEST SPINE W/O DYE: CPT | Mod: 26,,, | Performed by: RADIOLOGY

## 2019-06-18 PROCEDURE — 72146 MRI THORACIC SPINE WITHOUT CONTRAST: ICD-10-PCS | Mod: 26,,, | Performed by: RADIOLOGY

## 2019-06-18 PROCEDURE — 72141 MRI CERVICAL SPINE WITHOUT CONTRAST: ICD-10-PCS | Mod: 26,,, | Performed by: RADIOLOGY

## 2019-06-21 ENCOUNTER — TELEPHONE (OUTPATIENT)
Dept: NEUROSURGERY | Facility: CLINIC | Age: 74
End: 2019-06-21

## 2019-06-21 NOTE — TELEPHONE ENCOUNTER
----- Message from Po Huff MD sent at 6/18/2019  1:26 PM CDT -----  Tell him that I have reviewed his cervical spine MRI and thoracic spine MRI. He has a slight increased in degeneration above his cervical fusion at C3-4 and more pronounced stenosis at that level. Nothing severe that needs to be treated with surgery right now. He can complete PT and FU with me after.

## 2019-06-21 NOTE — TELEPHONE ENCOUNTER
Spoke with Ms. Fitch - patients wife, informed of results.  f/up. appt scheduled.     Mr. Fitch requests a call from Dr. Huff -per patients wife.

## 2019-06-25 ENCOUNTER — PATIENT MESSAGE (OUTPATIENT)
Dept: NEUROSURGERY | Facility: CLINIC | Age: 74
End: 2019-06-25

## 2019-06-26 ENCOUNTER — PATIENT MESSAGE (OUTPATIENT)
Dept: NEUROSURGERY | Facility: CLINIC | Age: 74
End: 2019-06-26

## 2019-06-29 ENCOUNTER — HOSPITAL ENCOUNTER (OUTPATIENT)
Dept: RADIOLOGY | Facility: HOSPITAL | Age: 74
Discharge: HOME | End: 2019-06-29
Attending: NEUROLOGICAL SURGERY
Payer: MEDICARE

## 2019-06-29 DIAGNOSIS — R15.9 INCONTINENCE OF FECES WITH FECAL URGENCY: ICD-10-CM

## 2019-06-29 DIAGNOSIS — R15.2 INCONTINENCE OF FECES WITH FECAL URGENCY: ICD-10-CM

## 2019-06-29 DIAGNOSIS — S32.010S CLOSED COMPRESSION FRACTURE OF FIRST LUMBAR VERTEBRA, SEQUELA: ICD-10-CM

## 2019-06-29 PROCEDURE — 72148 MRI LUMBAR SPINE W/O DYE: CPT | Mod: 26,,, | Performed by: RADIOLOGY

## 2019-06-29 PROCEDURE — 72148 MRI LUMBAR SPINE W/O DYE: CPT | Mod: TC

## 2019-06-29 PROCEDURE — 72148 MRI LUMBAR SPINE WITHOUT CONTRAST: ICD-10-PCS | Mod: 26,,, | Performed by: RADIOLOGY

## 2019-07-01 ENCOUNTER — TELEPHONE (OUTPATIENT)
Dept: NEUROSURGERY | Facility: CLINIC | Age: 74
End: 2019-07-01

## 2019-07-01 NOTE — TELEPHONE ENCOUNTER
----- Message from Po Huff MD sent at 7/1/2019 12:35 PM CDT -----  Tell him that I have also reviewed his lumbar spine MRI and everything looks stable in the lumbar spine

## 2019-07-25 ENCOUNTER — TELEPHONE (OUTPATIENT)
Dept: NEUROSURGERY | Facility: CLINIC | Age: 74
End: 2019-07-25

## 2019-08-27 ENCOUNTER — OFFICE VISIT (OUTPATIENT)
Dept: NEUROSURGERY | Facility: CLINIC | Age: 74
End: 2019-08-27
Payer: MEDICARE

## 2019-08-27 VITALS
DIASTOLIC BLOOD PRESSURE: 84 MMHG | HEIGHT: 71 IN | HEART RATE: 58 BPM | TEMPERATURE: 98 F | SYSTOLIC BLOOD PRESSURE: 125 MMHG | BODY MASS INDEX: 23.42 KG/M2 | WEIGHT: 167.31 LBS

## 2019-08-27 DIAGNOSIS — Z98.1 S/P CERVICAL SPINAL FUSION: Primary | ICD-10-CM

## 2019-08-27 DIAGNOSIS — S12.9XXD PSEUDOARTHROSIS OF CERVICAL SPINE, SUBSEQUENT ENCOUNTER: ICD-10-CM

## 2019-08-27 PROCEDURE — 3079F PR MOST RECENT DIASTOLIC BLOOD PRESSURE 80-89 MM HG: ICD-10-PCS | Mod: CPTII,S$GLB,, | Performed by: NEUROLOGICAL SURGERY

## 2019-08-27 PROCEDURE — 99212 OFFICE O/P EST SF 10 MIN: CPT | Mod: S$GLB,,, | Performed by: NEUROLOGICAL SURGERY

## 2019-08-27 PROCEDURE — 99999 PR PBB SHADOW E&M-EST. PATIENT-LVL III: CPT | Mod: PBBFAC,,, | Performed by: NEUROLOGICAL SURGERY

## 2019-08-27 PROCEDURE — 3079F DIAST BP 80-89 MM HG: CPT | Mod: CPTII,S$GLB,, | Performed by: NEUROLOGICAL SURGERY

## 2019-08-27 PROCEDURE — 99212 PR OFFICE/OUTPT VISIT, EST, LEVL II, 10-19 MIN: ICD-10-PCS | Mod: S$GLB,,, | Performed by: NEUROLOGICAL SURGERY

## 2019-08-27 PROCEDURE — 1101F PT FALLS ASSESS-DOCD LE1/YR: CPT | Mod: CPTII,S$GLB,, | Performed by: NEUROLOGICAL SURGERY

## 2019-08-27 PROCEDURE — 3074F PR MOST RECENT SYSTOLIC BLOOD PRESSURE < 130 MM HG: ICD-10-PCS | Mod: CPTII,S$GLB,, | Performed by: NEUROLOGICAL SURGERY

## 2019-08-27 PROCEDURE — 3074F SYST BP LT 130 MM HG: CPT | Mod: CPTII,S$GLB,, | Performed by: NEUROLOGICAL SURGERY

## 2019-08-27 PROCEDURE — 1101F PR PT FALLS ASSESS DOC 0-1 FALLS W/OUT INJ PAST YR: ICD-10-PCS | Mod: CPTII,S$GLB,, | Performed by: NEUROLOGICAL SURGERY

## 2019-08-27 PROCEDURE — 99999 PR PBB SHADOW E&M-EST. PATIENT-LVL III: ICD-10-PCS | Mod: PBBFAC,,, | Performed by: NEUROLOGICAL SURGERY

## 2019-08-27 RX ORDER — CELECOXIB 400 MG/1
400 CAPSULE ORAL 2 TIMES DAILY
Qty: 60 CAPSULE | Refills: 4 | Status: SHIPPED | OUTPATIENT
Start: 2019-08-27 | End: 2020-09-09

## 2019-08-27 NOTE — PROGRESS NOTES
NEUROSURGICAL PROGRESS NOTE    DATE OF SERVICE:  08/27/2019    ATTENDING PHYSICIAN:  Po Huff MD    SUBJECTIVE:    PRIOR HISTORY:    This is a very pleasant 73 y.o. male, who is status post C4-C7 ACDF in 2016.  Patient is complaining of worsening gait imbalance bilateral hand weakness.  He also has worsening neck pain.  He is not reporting arm or leg pain.  He reports occasional fecal incontinence.  He is scheduled to start physical therapy for his neck pain.  He has not done deep neck flexor strengthening exercises in the past.  Neck pain is worse when he uses the treadmill.  He has a tendency to lean forward.      INTERIM HISTORY:  He has done is cervical physical therapy without significant pain improvement.  The only pain relief he has in his neck is when he takes ibuprofen 800 mg.  He feels the neck pain the C4-5 C5-6 area.  The pain does not fell numbness of arms.  He is not complaining new weakness or numbness.  He recently had a new CT scan of the cervical spine.           Neck Disability  Neck Disability-Pain Score: 3  Neck Disability-Pain Intensity: The pain is moderate at the moment  Neck Disability-Personal Care: I can look after myself normally without causing extra pain  Neck Disability-Lifting: I can lift heavy weights but it gives extra pain  Neck Disability-Reading: I can read as much as I want to, with moderate pain in my neck  Neck Disability-Headaches: I have slight headaches that come infrequently  Neck Disability-Concentration: I can concentrate fully, when I want to, with slight difficulty  Neck Disability-work: I can do my usual work, but no more  Neck Disability-Driving: I can drive my car as long as I want with slight pain in my neck  Neck Disability-Sleeping: My sleep is mildly disturbed (1-2 hours sleeplessness)  Neck Disability-Recreation: I am able to engage in all my recreation activities with some neck pain    PAST MEDICAL HISTORY:  Active Ambulatory Problems     Diagnosis Date  Noted    Hypertension     Headache 11/08/2012    Neck pain 12/26/2013    Cervical spine disease 01/16/2014    Cervical stenosis of spinal canal 03/14/2014    Cervical spondylosis with myelopathy 03/14/2014    Cervical radiculopathy 10/24/2014    Acute prostatitis 01/20/2018    Gastroesophageal reflux disease 03/14/2019    Shortness of breath 03/14/2019    RBBB 03/14/2019     Resolved Ambulatory Problems     Diagnosis Date Noted    No Resolved Ambulatory Problems     Past Medical History:   Diagnosis Date    Basal cell carcinoma of forearm 2003    Basal cell carcinoma of forehead 2013    Cancer     GERD (gastroesophageal reflux disease)     Headache(784.0)     Hypertension     Retroperitoneal bleed 1980    Right bundle branch block     Syncope and collapse        PAST SURGICAL HISTORY:  Past Surgical History:   Procedure Laterality Date    cervical spine fusion      DEBORA-TRANSFORAMINAL N/A 12/9/2015    Performed by [facility] at [facility] CATH LAB    HIP FRACTURE SURGERY Right 1993    INJECTION-FACET Bilateral 4/27/2016    Performed by Romaine Guevara MD at [facility] PAIN MGT    Left shoulder surgery      SHOULDER ARTHROSCOPY W/ ROTATOR CUFF REPAIR Right 2014    SHOULDER ARTHROSCOPY W/ ROTATOR CUFF REPAIR Left 2013    TRANSURETHRAL RESECTION OF PROSTATE N/A 2014       SOCIAL HISTORY:   Social History     Socioeconomic History    Marital status:      Spouse name: Not on file    Number of children: Not on file    Years of education: Not on file    Highest education level: Not on file   Occupational History    Not on file   Social Needs    Financial resource strain: Not on file    Food insecurity:     Worry: Not on file     Inability: Not on file    Transportation needs:     Medical: Not on file     Non-medical: Not on file   Tobacco Use    Smoking status: Never Smoker    Smokeless tobacco: Never Used   Substance and Sexual Activity    Alcohol use: Yes      Alcohol/week: 3.6 oz     Types: 6 Cans of beer per week     Comment: occ.    Drug use: No    Sexual activity: Not on file   Lifestyle    Physical activity:     Days per week: Not on file     Minutes per session: Not on file    Stress: Not on file   Relationships    Social connections:     Talks on phone: Not on file     Gets together: Not on file     Attends Restorationism service: Not on file     Active member of club or organization: Not on file     Attends meetings of clubs or organizations: Not on file     Relationship status: Not on file   Other Topics Concern    Not on file   Social History Narrative    Not on file       FAMILY HISTORY:  Family History   Problem Relation Age of Onset    Heart disease Father     Heart attack Father     Heart disease Brother     Heart attack Brother        CURRENTS MEDICATIONS:  Current Outpatient Medications on File Prior to Visit   Medication Sig Dispense Refill    cyclobenzaprine HCl (FLEXERIL ORAL) Take 5 mg by mouth.      folic acid/multivit-min/lutein (CENTRUM SILVER ORAL) Take by mouth.      neomycin-polymyxin-dexamethasone (MAXITROL) 3.5mg/mL-10,000 unit/mL-0.1 % DrpS INSTILL 1 DROP INTO BOTH EYES 3 TIMES A DAY  0    pantoprazole (PROTONIX) 40 MG tablet 2 (two) times daily.       triamterene-hydrochlorothiazide 37.5-25 mg (DYAZIDE) 37.5-25 mg per capsule       vitamin D (VITAMIN D3) 1000 units Tab Take 2,000 Units by mouth once daily.      [DISCONTINUED] ibuprofen (ADVIL,MOTRIN) 800 MG tablet 800 mg every meal as needed.       diazePAM (VALIUM) 5 MG tablet Take 1-2 tablets (5-10 mg total) by mouth once as needed (one hour before MRI). 2 tablet 0    [DISCONTINUED] acetaminophen-codeine 300-30mg (TYLENOL #3) 300-30 mg Tab Take 1 tablet by mouth 3 (three) times daily as needed.  1    [DISCONTINUED] aspirin (ECOTRIN) 81 MG EC tablet Take 81 mg by mouth once daily.       No current facility-administered medications on file prior to visit.         ALLERGIES:  Review of patient's allergies indicates:   Allergen Reactions    Ciprofloxacin (bulk)     Seconal [secobarbital sodium] Other (See Comments)     hallucinations       REVIEW OF SYSTEMS:  Review of Systems   Constitutional: Negative for diaphoresis, fever and weight loss.   Respiratory: Negative for shortness of breath.    Cardiovascular: Negative for chest pain.   Gastrointestinal: Negative for blood in stool.   Genitourinary: Negative for hematuria.   Endo/Heme/Allergies: Does not bruise/bleed easily.   All other systems reviewed and are negative.        OBJECTIVE:    PHYSICAL EXAMINATION:   Vitals:    08/27/19 0930   BP: 125/84   Pulse: (!) 58   Temp: 97.7 °F (36.5 °C)       Neurosurgery Physical Exam    Ortho Exam      Neurologic Exam    Physical Exam:  Vitals reviewed.     Constitutional: He appears well-developed and well-nourished.      Eyes: Pupils are equal, round, and reactive to light. Conjunctivae and EOM are normal.      Cardiovascular: Normal distal pulses and no edema.      Abdominal: Soft.      Skin: Skin displays no rash on trunk and no rash on extremities. Skin displays no lesions on trunk and no lesions on extremities.     Psych/Behavior: He is alert. He is oriented to person, place, and time. He has a normal mood and affect.     Musculoskeletal:        Neck: Range of motion is limited. ROM severity is Impaired retropulsion.     Neurological:        DTRs: Tricep reflexes are 2+ on the right side and 2+ on the left side. Bicep reflexes are 2+ on the right side and 2+ on the left side. Brachioradialis reflexes are 2+ on the right side and 2+ on the left side. Patellar reflexes are 2+ on the right side and 2+ on the left side. Achilles reflexes are 2+ on the right side and 2+ on the left side.         Back Exam      Muscle Strength   Right Quadriceps:  5/5   Left Quadriceps:  5/5   Right Hamstrings:  5/5   Left Hamstrings:  5/5      Tests   Straight leg raise right:  negative  Straight leg raise left: negative     Other   Toe walk: normal  Heel walk: normal                       Neurologic Exam      Mental Status   Oriented to person, place, and time.   Speech: speech is normal   Level of consciousness: alert     Cranial Nerves   Cranial nerves II through XII intact.      CN III, IV, VI   Pupils are equal, round, and reactive to light.  Extraocular motions are normal.      Motor Exam   Muscle bulk: normal  Overall muscle tone: increased     Strength   Right deltoid: 5/5  Left deltoid: 5/5  Right biceps: 5/5  Left biceps: 5/5  Right triceps: 5/5  Left triceps: 5/5  Right wrist flexion: 5/5  Left wrist flexion: 5/5  Right wrist extension: 5/5  Left wrist extension: 5/5  Right interossei: 4/5  Left interossei: 4/5  Right iliopsoas: 5/5  Left iliopsoas: 5/5  Right quadriceps: 5/5  Left quadriceps: 5/5  Right hamstrin/5  Left hamstrin/5  Right anterior tibial: 5/5  Left anterior tibial: 5/5  Right posterior tibial: 5/5  Left posterior tibial: 5/5  Right peroneal: 5/5  Left peroneal: 5/5  Right gastroc: 5/5  Left gastroc: 5/5     Sensory Exam   Light touch normal.   Pinprick normal.      Gait, Coordination, and Reflexes      Gait  Gait: normal     Coordination   Finger to nose coordination: normal  Tandem walking coordination: abnormal     Reflexes   Right brachioradialis: 2+  Left brachioradialis: 2+  Right biceps: 2+  Left biceps: 2+  Right triceps: 2+  Left triceps: 2+  Right patellar: 2+  Left patellar: 2+  Right achilles: 2+  Left achilles: 2+  Right plantar: normal  Left plantar: normal  Right Newsome: absent  Left Newsome: absent  Right ankle clonus: absent  Left ankle clonus: absent      DIAGNOSTIC DATA:  I personally interpreted the following imaging:   Cervical CT 2019 shows C4-7 ACDF. With non fusion at c4-5 and C5-6.   Cervical spine MRI 2019 shows C3-4 spondylosis with mild stenosis    ASSESMENT:  This is a 74 y.o. male with     Problem List Items Addressed This  Visit     None      Visit Diagnoses     S/P cervical spinal fusion    -  Primary    Pseudoarthrosis of cervical spine, subsequent encounter                PLAN:  Recommending medial branch block and possible RFA for his chronic neck pain  Posterior revision spinal fusion to consider if conservative management fails.  Celebrex 400mg BID ordered         oP Huff MD  Cell:[phone]

## 2019-12-06 ENCOUNTER — TELEPHONE (OUTPATIENT)
Dept: NEUROSURGERY | Facility: CLINIC | Age: 74
End: 2019-12-06

## 2019-12-06 NOTE — TELEPHONE ENCOUNTER
Spoke to the patient instructed to follow up with pain management regarding the injection not working. Appointment given on 12/18/19 to follow up with dr Huff.

## 2019-12-06 NOTE — TELEPHONE ENCOUNTER
----- Message from Maribell Galvez sent at 12/6/2019  9:08 AM CST -----  Contact: spouse, [phone]  States patient has an injection in his neck on 11/5 and is in a lot of pain, worse than he was before and requests to be seen as soon as possible. Please call.

## 2019-12-18 ENCOUNTER — OFFICE VISIT (OUTPATIENT)
Dept: NEUROSURGERY | Facility: CLINIC | Age: 74
End: 2019-12-18
Payer: MEDICARE

## 2019-12-18 DIAGNOSIS — S12.9XXD PSEUDOARTHROSIS OF CERVICAL SPINE, SUBSEQUENT ENCOUNTER: ICD-10-CM

## 2019-12-18 DIAGNOSIS — M47.812 CERVICAL SPONDYLOSIS: Primary | ICD-10-CM

## 2019-12-18 PROCEDURE — 99999 PR PBB SHADOW E&M-EST. PATIENT-LVL II: CPT | Mod: PBBFAC,,, | Performed by: NEUROLOGICAL SURGERY

## 2019-12-18 PROCEDURE — 99214 PR OFFICE/OUTPT VISIT, EST, LEVL IV, 30-39 MIN: ICD-10-PCS | Mod: S$GLB,,, | Performed by: NEUROLOGICAL SURGERY

## 2019-12-18 PROCEDURE — 1101F PR PT FALLS ASSESS DOC 0-1 FALLS W/OUT INJ PAST YR: ICD-10-PCS | Mod: CPTII,S$GLB,, | Performed by: NEUROLOGICAL SURGERY

## 2019-12-18 PROCEDURE — 99214 OFFICE O/P EST MOD 30 MIN: CPT | Mod: S$GLB,,, | Performed by: NEUROLOGICAL SURGERY

## 2019-12-18 PROCEDURE — 1125F AMNT PAIN NOTED PAIN PRSNT: CPT | Mod: S$GLB,,, | Performed by: NEUROLOGICAL SURGERY

## 2019-12-18 PROCEDURE — 1125F PR PAIN SEVERITY QUANTIFIED, PAIN PRESENT: ICD-10-PCS | Mod: S$GLB,,, | Performed by: NEUROLOGICAL SURGERY

## 2019-12-18 PROCEDURE — 99999 PR PBB SHADOW E&M-EST. PATIENT-LVL II: ICD-10-PCS | Mod: PBBFAC,,, | Performed by: NEUROLOGICAL SURGERY

## 2019-12-18 PROCEDURE — 1159F PR MEDICATION LIST DOCUMENTED IN MEDICAL RECORD: ICD-10-PCS | Mod: S$GLB,,, | Performed by: NEUROLOGICAL SURGERY

## 2019-12-18 PROCEDURE — 1101F PT FALLS ASSESS-DOCD LE1/YR: CPT | Mod: CPTII,S$GLB,, | Performed by: NEUROLOGICAL SURGERY

## 2019-12-18 PROCEDURE — 1159F MED LIST DOCD IN RCRD: CPT | Mod: S$GLB,,, | Performed by: NEUROLOGICAL SURGERY

## 2019-12-18 RX ORDER — ORPHENADRINE CITRATE 100 MG/1
100 TABLET, EXTENDED RELEASE ORAL 2 TIMES DAILY PRN
Qty: 60 TABLET | Refills: 3 | Status: SHIPPED | OUTPATIENT
Start: 2019-12-18 | End: 2019-12-28

## 2019-12-19 ENCOUNTER — TELEPHONE (OUTPATIENT)
Dept: NEUROSURGERY | Facility: CLINIC | Age: 74
End: 2019-12-19

## 2019-12-19 NOTE — TELEPHONE ENCOUNTER
----- Message from Christine Malloy sent at 12/19/2019  1:55 PM CST -----  Contact: [phone]/wife/Sara  Patient's wife Sara is requesting a call back to schedule his surgery. Please call. Thanks

## 2019-12-20 NOTE — PROGRESS NOTES
NEUROSURGICAL PROGRESS NOTE    DATE OF SERVICE:  12/19/2019    ATTENDING PHYSICIAN:  Po Huff MD    SUBJECTIVE:    PRIOR HISTORY:     This is a very pleasant 73 y.o. male, who is status post C4-C7 ACDF in 2016.  Patient is complaining of worsening gait imbalance bilateral hand weakness.  He also has worsening neck pain.  He is not reporting arm or leg pain.  He reports occasional fecal incontinence.  He is scheduled to start physical therapy for his neck pain.  He has not done deep neck flexor strengthening exercises in the past.  Neck pain is worse when he uses the treadmill.  He has a tendency to lean forward.       He has done is cervical physical therapy without significant pain improvement.  The only pain relief he has in his neck is when he takes ibuprofen 800 mg.  He feels the neck pain the C4-5 C5-6 area.  The pain does not fell numbness of arms.  He is not complaining new weakness or numbness.  He recently had a new CT scan of the cervical spine.      INTERIM HISTORY:    He has received medial branch block injection and his pain has become worse. Neck pain intensity is felt in the same distribution but worse. Pain is interfering with his QOL and functional status.          Neck Disability  Neck Disability-Pain Score: 4  Neck Disability-Pain Intensity: The pain is moderate at the moment  Neck Disability-Personal Care: I can look after myself normally without causing extra pain  Neck Disability-Lifting: I can lift very light weights  Neck Disability-Reading: I can read as much as I want to, with moderate pain in my neck  Neck Disability-Headaches: I have slight headaches that come infrequently  Neck Disability-Concentration: I can concentrate fully, when I want to, with no difficulty  Neck Disability-work: I can do my usual work, but no more  Neck Disability-Driving: I can drive my car as long as I want with moderate pain in my neck  Neck Disability-Sleeping: My sleep is mildly disturbed (1-2 hours  sleeplessness)  Neck Disability-Recreation: I am able to engage in most but not all of my usual recreation activiites because of pain in my neck    PAST MEDICAL HISTORY:  Active Ambulatory Problems     Diagnosis Date Noted    Hypertension     Headache 11/08/2012    Neck pain 12/26/2013    Cervical spine disease 01/16/2014    Cervical stenosis of spinal canal 03/14/2014    Cervical spondylosis with myelopathy 03/14/2014    Cervical radiculopathy 10/24/2014    Acute prostatitis 01/20/2018    Gastroesophageal reflux disease 03/14/2019    Shortness of breath 03/14/2019    RBBB 03/14/2019     Resolved Ambulatory Problems     Diagnosis Date Noted    No Resolved Ambulatory Problems     Past Medical History:   Diagnosis Date    Basal cell carcinoma of forearm 2003    Basal cell carcinoma of forehead 2013    Cancer     GERD (gastroesophageal reflux disease)     Headache(784.0)     Retroperitoneal bleed 1980    Right bundle branch block     Syncope and collapse        PAST SURGICAL HISTORY:  Past Surgical History:   Procedure Laterality Date    cervical spine fusion      HIP FRACTURE SURGERY Right 1993    Left shoulder surgery      SHOULDER ARTHROSCOPY W/ ROTATOR CUFF REPAIR Right 2014    SHOULDER ARTHROSCOPY W/ ROTATOR CUFF REPAIR Left 2013    TRANSURETHRAL RESECTION OF PROSTATE N/A 2014       SOCIAL HISTORY:   Social History     Socioeconomic History    Marital status:      Spouse name: Not on file    Number of children: Not on file    Years of education: Not on file    Highest education level: Not on file   Occupational History    Not on file   Social Needs    Financial resource strain: Not on file    Food insecurity:     Worry: Not on file     Inability: Not on file    Transportation needs:     Medical: Not on file     Non-medical: Not on file   Tobacco Use    Smoking status: Never Smoker    Smokeless tobacco: Never Used   Substance and Sexual Activity    Alcohol use: Yes      Alcohol/week: 6.0 standard drinks     Types: 6 Cans of beer per week     Comment: occ.    Drug use: No    Sexual activity: Not on file   Lifestyle    Physical activity:     Days per week: Not on file     Minutes per session: Not on file    Stress: Not on file   Relationships    Social connections:     Talks on phone: Not on file     Gets together: Not on file     Attends Catholic service: Not on file     Active member of club or organization: Not on file     Attends meetings of clubs or organizations: Not on file     Relationship status: Not on file   Other Topics Concern    Not on file   Social History Narrative    Not on file       FAMILY HISTORY:  Family History   Problem Relation Age of Onset    Heart disease Father     Heart attack Father     Heart disease Brother     Heart attack Brother        CURRENTS MEDICATIONS:  Current Outpatient Medications on File Prior to Visit   Medication Sig Dispense Refill    celecoxib (CELEBREX) 400 MG capsule Take 1 capsule (400 mg total) by mouth 2 (two) times daily. 60 capsule 4    cyclobenzaprine HCl (FLEXERIL ORAL) Take 5 mg by mouth.      folic acid/multivit-min/lutein (CENTRUM SILVER ORAL) Take by mouth.      neomycin-polymyxin-dexamethasone (MAXITROL) 3.5mg/mL-10,000 unit/mL-0.1 % DrpS INSTILL 1 DROP INTO BOTH EYES 3 TIMES A DAY  0    pantoprazole (PROTONIX) 40 MG tablet 2 (two) times daily.       triamterene-hydrochlorothiazide 37.5-25 mg (DYAZIDE) 37.5-25 mg per capsule       vitamin D (VITAMIN D3) 1000 units Tab Take 2,000 Units by mouth once daily.      diazePAM (VALIUM) 5 MG tablet Take 1-2 tablets (5-10 mg total) by mouth once as needed (one hour before MRI). 2 tablet 0     No current facility-administered medications on file prior to visit.        ALLERGIES:  Review of patient's allergies indicates:   Allergen Reactions    Ciprofloxacin (bulk)     Seconal [secobarbital sodium] Other (See Comments)     hallucinations       REVIEW OF  SYSTEMS:  Review of Systems   Constitutional: Negative for diaphoresis, fever and weight loss.   Respiratory: Negative for shortness of breath.    Cardiovascular: Negative for chest pain.   Gastrointestinal: Negative for blood in stool.   Genitourinary: Negative for hematuria.   Endo/Heme/Allergies: Does not bruise/bleed easily.   All other systems reviewed and are negative.        OBJECTIVE:    PHYSICAL EXAMINATION:   There were no vitals filed for this visit.    Physical Exam:  Vitals reviewed.    Constitutional: He appears well-developed and well-nourished.     Eyes: Pupils are equal, round, and reactive to light. Conjunctivae and EOM are normal.     Cardiovascular: Normal distal pulses and no edema.     Abdominal: Soft.     Skin: Skin displays no rash on trunk and no rash on extremities. Skin displays no lesions on trunk and no lesions on extremities.     Psych/Behavior: He is alert. He is oriented to person, place, and time. He has a normal mood and affect.     Musculoskeletal:        Neck: Range of motion is limited.     Neurological:        DTRs: Tricep reflexes are 2+ on the right side and 2+ on the left side. Bicep reflexes are 2+ on the right side and 2+ on the left side. Brachioradialis reflexes are 2+ on the right side and 2+ on the left side. Patellar reflexes are 2+ on the right side and 2+ on the left side. Achilles reflexes are 2+ on the right side and 2+ on the left side.       Back Exam     Tenderness   The patient is experiencing tenderness in the cervical.    Range of Motion   Extension: normal   Flexion: normal   Lateral bend right: normal   Lateral bend left: normal   Rotation right: normal   Rotation left: normal     Muscle Strength   Right Quadriceps:  5/5   Left Quadriceps:  5/5   Right Hamstrings:  5/5   Left Hamstrings:  5/5     Tests   Straight leg raise right: negative  Straight leg raise left: negative    Other   Toe walk: normal  Heel walk: normal              Neurologic Exam      Mental Status   Oriented to person, place, and time.   Speech: speech is normal   Level of consciousness: alert    Cranial Nerves   Cranial nerves II through XII intact.     CN III, IV, VI   Pupils are equal, round, and reactive to light.  Extraocular motions are normal.     Motor Exam   Muscle bulk: normal  Overall muscle tone: normal    Strength   Right deltoid: 5/5  Left deltoid: 5/5  Right biceps: 5/5  Left biceps: 5/5  Right triceps: 5/5  Left triceps: 5/5  Right wrist flexion: 5/5  Left wrist flexion: 5/5  Right wrist extension: 5/5  Left wrist extension: 5/5  Right interossei: 5/5  Left interossei: 5/5  Right iliopsoas: 5/5  Left iliopsoas: 5/5  Right quadriceps: 5/5  Left quadriceps: 5/5  Right hamstrin/5  Left hamstrin/5  Right anterior tibial: 5/5  Left anterior tibial: 5/5  Right posterior tibial: 5/5  Left posterior tibial: 5/5  Right peroneal: 5/5  Left peroneal: 5/5  Right gastroc: 5/5  Left gastroc: 5/5    Sensory Exam   Light touch normal.   Pinprick normal.     Gait, Coordination, and Reflexes     Gait  Gait: normal    Coordination   Finger to nose coordination: normal  Tandem walking coordination: normal    Reflexes   Right brachioradialis: 2+  Left brachioradialis: 2+  Right biceps: 2+  Left biceps: 2+  Right triceps: 2+  Left triceps: 2+  Right patellar: 2+  Left patellar: 2+  Right achilles: 2+  Left achilles: 2+  Right plantar: normal  Left plantar: normal  Right Newsome: absent  Left Newsome: absent  Right ankle clonus: absent  Left ankle clonus: absent        DIAGNOSTIC DATA:  I personally interpreted the following imaging:   CT cervical spine 2019: S/P C4-7 ACDF, pseudoarthrosis at C4-5 and C5-6. Consolidation of fusion at C6-7  MRI cervical spine 2019: C3-4 mild stenosis for spondylosis     ASSESMENT:  This is a 74 y.o. male with     Problem List Items Addressed This Visit     None      Visit Diagnoses     Cervical spondylosis    -  Primary    Pseudoarthrosis of cervical spine,  subsequent encounter                PLAN:  I explained the natural history of the disease and all treatment options. I recommended a C3-6 laminectomy and posterior instrumented fusion to treat the patient pseudoarthrosis (failed fusion at C4-5 and C5-6) in order to relieve patient's neck pain and disability.     We have discussed the risks of surgery including bleeding, infection, failure of surgery, CSF leak, nerve root injury, spinal cord injury, weakness, paralysis, peripheral neuropathy, malplaced hardware, migration of hardware, non-union, need for reoperation. Patient understands the risks and would like to proceed with surgery.      The patient has increase perioperative risks because of these comorbidities: essential hypertension.         Po Huff MD  Cell:[phone]

## 2019-12-20 NOTE — TELEPHONE ENCOUNTER
Left message to contact clinic re: scheduling sx.     Surgery case placed on MD desk for completion

## 2019-12-23 ENCOUNTER — TELEPHONE (OUTPATIENT)
Dept: NEUROSURGERY | Facility: CLINIC | Age: 74
End: 2019-12-23

## 2019-12-23 NOTE — TELEPHONE ENCOUNTER
----- Message from Daxa Colón sent at 12/20/2019  5:14 PM CST -----  Contact: [phone]-self  Patient is returning your call.

## 2019-12-23 NOTE — TELEPHONE ENCOUNTER
Spoke w/ Mrs Fitch- pt wife. Sx has been scheduled 04/07/2020.      Pre op clearance form mailed to patient.

## 2019-12-27 ENCOUNTER — PATIENT MESSAGE (OUTPATIENT)
Dept: NEUROSURGERY | Facility: CLINIC | Age: 74
End: 2019-12-27

## 2019-12-31 ENCOUNTER — TELEPHONE (OUTPATIENT)
Dept: NEUROSURGERY | Facility: CLINIC | Age: 74
End: 2019-12-31

## 2019-12-31 DIAGNOSIS — M47.12 CERVICAL SPONDYLOSIS WITH MYELOPATHY: ICD-10-CM

## 2019-12-31 DIAGNOSIS — Z98.1 S/P CERVICAL SPINAL FUSION: Primary | ICD-10-CM

## 2019-12-31 NOTE — TELEPHONE ENCOUNTER
Spoke to the patient instructed Dr Huff is booked to the end of April and if he has a cancellation we can move him up earlier. VU

## 2020-01-23 ENCOUNTER — TELEPHONE (OUTPATIENT)
Dept: NEUROSURGERY | Facility: CLINIC | Age: 75
End: 2020-01-23

## 2020-02-18 ENCOUNTER — TELEPHONE (OUTPATIENT)
Dept: CARDIOLOGY | Facility: CLINIC | Age: 75
End: 2020-02-18

## 2020-02-18 NOTE — TELEPHONE ENCOUNTER
Reached out to schedule follow up appointment for April.  Spoke to wife.  She states that patient is no longer seeing Dr Valdivia.  Patient is now seeing Dr Her at [facility].

## 2020-03-30 ENCOUNTER — TELEPHONE (OUTPATIENT)
Dept: NEUROSURGERY | Facility: CLINIC | Age: 75
End: 2020-03-30

## 2020-04-14 ENCOUNTER — TELEPHONE (OUTPATIENT)
Dept: NEUROSURGERY | Facility: CLINIC | Age: 75
End: 2020-04-14

## 2020-04-14 NOTE — TELEPHONE ENCOUNTER
Spoke to the patient he would like to waitt until there is a vaccine for the virus before rescheduling the surgery

## 2020-06-04 ENCOUNTER — TELEPHONE (OUTPATIENT)
Dept: UROLOGY | Facility: CLINIC | Age: 75
End: 2020-06-04

## 2020-06-04 NOTE — TELEPHONE ENCOUNTER
Spoke to pts wife. Informed pt needs to r/s with correct provider. She verbalized understanding. They will reschedule with correct provider.

## 2020-06-05 ENCOUNTER — OFFICE VISIT (OUTPATIENT)
Dept: UROLOGY | Facility: CLINIC | Age: 75
End: 2020-06-05
Payer: MEDICARE

## 2020-06-05 VITALS
WEIGHT: 167 LBS | DIASTOLIC BLOOD PRESSURE: 85 MMHG | HEART RATE: 64 BPM | HEIGHT: 70 IN | BODY MASS INDEX: 23.91 KG/M2 | SYSTOLIC BLOOD PRESSURE: 142 MMHG

## 2020-06-05 DIAGNOSIS — R97.20 ELEVATED PSA: Primary | ICD-10-CM

## 2020-06-05 DIAGNOSIS — N40.0 ENLARGED PROSTATE: ICD-10-CM

## 2020-06-05 LAB
BILIRUB SERPL-MCNC: ABNORMAL MG/DL
BLOOD URINE, POC: ABNORMAL
CLARITY, POC UA: CLEAR
COLOR, POC UA: YELLOW
GLUCOSE UR QL STRIP: NORMAL
KETONES UR QL STRIP: ABNORMAL
LEUKOCYTE ESTERASE URINE, POC: ABNORMAL
NITRITE, POC UA: ABNORMAL
PH, POC UA: 5
PROTEIN, POC: ABNORMAL
SPECIFIC GRAVITY, POC UA: 1
UROBILINOGEN, POC UA: NORMAL

## 2020-06-05 PROCEDURE — 99204 OFFICE O/P NEW MOD 45 MIN: CPT | Mod: 25,S$GLB,, | Performed by: UROLOGY

## 2020-06-05 PROCEDURE — 99204 PR OFFICE/OUTPT VISIT, NEW, LEVL IV, 45-59 MIN: ICD-10-PCS | Mod: 25,S$GLB,, | Performed by: UROLOGY

## 2020-06-05 PROCEDURE — 1126F PR PAIN SEVERITY QUANTIFIED, NO PAIN PRESENT: ICD-10-PCS | Mod: S$GLB,,, | Performed by: UROLOGY

## 2020-06-05 PROCEDURE — 3079F DIAST BP 80-89 MM HG: CPT | Mod: CPTII,S$GLB,, | Performed by: UROLOGY

## 2020-06-05 PROCEDURE — 3079F PR MOST RECENT DIASTOLIC BLOOD PRESSURE 80-89 MM HG: ICD-10-PCS | Mod: CPTII,S$GLB,, | Performed by: UROLOGY

## 2020-06-05 PROCEDURE — 81002 POCT URINE DIPSTICK WITHOUT MICROSCOPE: ICD-10-PCS | Mod: S$GLB,,, | Performed by: UROLOGY

## 2020-06-05 PROCEDURE — 81002 URINALYSIS NONAUTO W/O SCOPE: CPT | Mod: S$GLB,,, | Performed by: UROLOGY

## 2020-06-05 PROCEDURE — 1159F PR MEDICATION LIST DOCUMENTED IN MEDICAL RECORD: ICD-10-PCS | Mod: S$GLB,,, | Performed by: UROLOGY

## 2020-06-05 PROCEDURE — 1159F MED LIST DOCD IN RCRD: CPT | Mod: S$GLB,,, | Performed by: UROLOGY

## 2020-06-05 PROCEDURE — 1101F PT FALLS ASSESS-DOCD LE1/YR: CPT | Mod: CPTII,S$GLB,, | Performed by: UROLOGY

## 2020-06-05 PROCEDURE — 1126F AMNT PAIN NOTED NONE PRSNT: CPT | Mod: S$GLB,,, | Performed by: UROLOGY

## 2020-06-05 PROCEDURE — 3077F SYST BP >= 140 MM HG: CPT | Mod: CPTII,S$GLB,, | Performed by: UROLOGY

## 2020-06-05 PROCEDURE — 1101F PR PT FALLS ASSESS DOC 0-1 FALLS W/OUT INJ PAST YR: ICD-10-PCS | Mod: CPTII,S$GLB,, | Performed by: UROLOGY

## 2020-06-05 PROCEDURE — 3077F PR MOST RECENT SYSTOLIC BLOOD PRESSURE >= 140 MM HG: ICD-10-PCS | Mod: CPTII,S$GLB,, | Performed by: UROLOGY

## 2020-06-05 RX ORDER — IBUPROFEN 800 MG/1
800 TABLET ORAL EVERY 6 HOURS PRN
COMMUNITY

## 2020-06-05 RX ORDER — TRIAMTERENE AND HYDROCHLOROTHIAZIDE 37.5; 25 MG/1; MG/1
1 CAPSULE ORAL EVERY MORNING
COMMUNITY

## 2020-06-05 RX ORDER — ASPIRIN 81 MG/1
81 TABLET ORAL DAILY
COMMUNITY
End: 2022-09-16

## 2020-06-05 RX ORDER — LOPERAMIDE HYDROCHLORIDE 2 MG/1
2 CAPSULE ORAL 4 TIMES DAILY PRN
COMMUNITY

## 2020-06-05 NOTE — PATIENT INSTRUCTIONS

## 2020-06-05 NOTE — PROGRESS NOTES
"Subjective:      Erasmo Fitch is a 74 y.o. male who was self referred for evaluation of elevated psa.  Other doctors are at [facility]  Prefers to transfer care to [facility]    Neg prostate biopsy appox 1996 per pt; no records available    PSA now rising and free PSA 11%    No LUTS since TURP approx 2016  Had severe LUTS prior to TURP    No UTIs since TURP          The following portions of the patient's history were reviewed and updated as appropriate: allergies, current medications, past family history, past medical history, past social history, past surgical history and problem list.    Review of Systems  Constitutional: no fever or chills  ENT: no nasal congestion or sore throat  Respiratory: no cough or shortness of breath  Cardiovascular: no chest pain or palpitations  Gastrointestinal: no nausea or vomiting, tolerating diet  Genitourinary: as per HPI  Hematologic/Lymphatic: no easy bruising or lymphadenopathy  Musculoskeletal: no arthralgias or myalgias  Neurological: no seizures or tremors  Behavioral/Psych: no auditory or visual hallucinations     Objective:   Vitals: BP (!) 142/85 (BP Location: Right arm, Patient Position: Sitting, BP Method: Large (Automatic))   Pulse 64   Ht 5' 10" (1.778 m)   Wt 75.8 kg (167 lb)   BMI 23.96 kg/m²     Physical Exam   General: alert and oriented, no acute distress  Head: normocephalic, atraumatic  Neck: normal ROM  Respiratory: Symmetric expansion, non-labored breathing  Cardiovascular: no peripheral edema  Abdomen: soft, non tender, non distended, no palpable masses, no hernias, no hepatomegaly or splenomegaly  Genitourinary:   Penis: normal, no lesions, patent orthotopic meatus, no plaques  Scrotum: no rashes or skin changes;   Testes: descended bilaterally, no masses, nontender, normal epididymides bilaterally, no hydroceles  Prostate: firm bilaterally with TUR defect; seminal vesicles not palpated  Rectum: normal rectal tone, no rectal mass, normal " perineum  Lymphatic: no inguinal nodes  Skin: normal coloration and turgor, no rashes, no suspicious skin lesions noted  Neuro: alert and oriented x3, no gross deficits  Psych: normal judgment and insight, normal mood/affect and non-anxious    Physical Exam    Lab Review   Urinalysis demonstrates negative for all components  Lab Results   Component Value Date    WBC 7.26 03/25/2019    HGB 14.7 03/25/2019    HGB 14.7 03/25/2019    HCT 44.3 03/25/2019    MCV 92 03/25/2019     03/25/2019     Lab Results   Component Value Date    CREATININE 0.9 03/25/2019    BUN 15 03/25/2019       PSA  5.3 11% free 5/2020  4.3 2/2020  3.0 4/2019    No results found for: PSA  Imaging  -  Assessment:     1. Elevated PSA    2. Enlarged prostate      -  Plan:     Orders Placed This Encounter    Transrectal Ultrasound w/ Biopsy    MRI Pelvis W WO Contrast    Basic metabolic panel    POCT URINE DIPSTICK WITHOUT MICROSCOPE     If we can get insurance approval for MR, we will get MR prior to Uronav biopsy which will be schedule 6/22 at [facility]    Due to cipro allergy, will give Rocephin 1g IM and gent 80mg rather than cipro po prior to biopsy

## 2020-06-12 ENCOUNTER — TELEPHONE (OUTPATIENT)
Dept: UROLOGY | Facility: CLINIC | Age: 75
End: 2020-06-12

## 2020-06-12 NOTE — TELEPHONE ENCOUNTER
----- Message from Ana Luisa Phillips sent at 6/12/2020 12:27 PM CDT -----  Contact: self : [phone]  .Type:  Patient Returning Call    Who Called:  Self     Who Left Message for Patient: Hiwot Yin    Would the patient rather a call back or a response via [facility]? Call back     Best Call Back Number: [phone]

## 2020-06-12 NOTE — TELEPHONE ENCOUNTER
----- Message from Marisa Heath sent at 6/11/2020  1:44 PM CDT -----  Contact: Self      ----- Message -----  From: Shelli Tamayo  Sent: 6/11/2020  12:18 PM CDT  To: Roni Cottrell Staff    Type: Patient Call Back    Who called:Erasmo    What is the request in detail:Patient called to schedule biopsy at earliest appointment. Please call    [facility]    Would the patient rather a call back or a response via [facility]? Call    Best call back number:[phone]

## 2020-06-12 NOTE — TELEPHONE ENCOUNTER
Spoke to the patient and he wants to keep the uronav appointment 06/22/20 1:00 , number given to the patient to reschedule the mri

## 2020-06-12 NOTE — TELEPHONE ENCOUNTER
Called the patient left detail message for the patient to contact the office to see if wants to have uronav 06/22/20 . Patient appointment was canceled per the patient .patient was called to see if he wanted to keep appointment

## 2020-06-19 ENCOUNTER — TELEPHONE (OUTPATIENT)
Dept: UROLOGY | Facility: CLINIC | Age: 75
End: 2020-06-19

## 2020-06-19 NOTE — TELEPHONE ENCOUNTER
----- Message from Ynes Hidalgo sent at 6/19/2020  1:58 PM CDT -----  Regarding: returned call  Type: Patient Call Back    Who called:pt    What is the request in detail:pt returned the nurse's phone call. Call pt    Can the clinic reply by [facility]?    Would the patient rather a call back or a response via [facility]? call    Best call back number:[phone] (home) [phone]      Additional Information:

## 2020-06-19 NOTE — TELEPHONE ENCOUNTER
SPOKE TO THE PATIENT AND THE DOCTOR AND WAS INFORMED THAT HE DID NOT HAVE THE MRI PER DR TAVARES HE WILL STILL DO THE URONAV.

## 2020-06-22 ENCOUNTER — PROCEDURE VISIT (OUTPATIENT)
Dept: UROLOGY | Facility: CLINIC | Age: 75
End: 2020-06-22
Payer: MEDICARE

## 2020-06-22 VITALS
RESPIRATION RATE: 18 BRPM | TEMPERATURE: 98 F | DIASTOLIC BLOOD PRESSURE: 80 MMHG | BODY MASS INDEX: 25.18 KG/M2 | SYSTOLIC BLOOD PRESSURE: 159 MMHG | HEART RATE: 64 BPM | WEIGHT: 175.88 LBS | HEIGHT: 70 IN

## 2020-06-22 DIAGNOSIS — N40.0 ENLARGED PROSTATE: ICD-10-CM

## 2020-06-22 DIAGNOSIS — R97.20 ELEVATED PSA: Primary | ICD-10-CM

## 2020-06-22 PROCEDURE — 55700 TRANSRECTAL ULTRASOUND W/ BIOPSY: CPT | Mod: S$GLB,,, | Performed by: STUDENT IN AN ORGANIZED HEALTH CARE EDUCATION/TRAINING PROGRAM

## 2020-06-22 PROCEDURE — 88305 TISSUE EXAM BY PATHOLOGIST: ICD-10-PCS | Mod: 26,,, | Performed by: PATHOLOGY

## 2020-06-22 PROCEDURE — 88305 TISSUE EXAM BY PATHOLOGIST: CPT | Performed by: PATHOLOGY

## 2020-06-22 PROCEDURE — 76872 TRANSRECTAL ULTRASOUND W/ BIOPSY: ICD-10-PCS | Mod: S$GLB,,, | Performed by: STUDENT IN AN ORGANIZED HEALTH CARE EDUCATION/TRAINING PROGRAM

## 2020-06-22 PROCEDURE — 88305 TISSUE EXAM BY PATHOLOGIST: CPT | Mod: 26,,, | Performed by: PATHOLOGY

## 2020-06-22 PROCEDURE — 96372 THER/PROPH/DIAG INJ SC/IM: CPT | Mod: 59,S$GLB,, | Performed by: UROLOGY

## 2020-06-22 PROCEDURE — 96372 PR INJECTION,THERAP/PROPH/DIAG2ST, IM OR SUBCUT: ICD-10-PCS | Mod: 59,S$GLB,, | Performed by: UROLOGY

## 2020-06-22 PROCEDURE — 76872 US TRANSRECTAL: CPT | Mod: S$GLB,,, | Performed by: STUDENT IN AN ORGANIZED HEALTH CARE EDUCATION/TRAINING PROGRAM

## 2020-06-22 PROCEDURE — 55700 TRANSRECTAL ULTRASOUND W/ BIOPSY: ICD-10-PCS | Mod: S$GLB,,, | Performed by: STUDENT IN AN ORGANIZED HEALTH CARE EDUCATION/TRAINING PROGRAM

## 2020-06-22 RX ORDER — LIDOCAINE HYDROCHLORIDE 10 MG/ML
20 INJECTION INFILTRATION; PERINEURAL
Status: COMPLETED | OUTPATIENT
Start: 2020-06-22 | End: 2020-06-22

## 2020-06-22 RX ORDER — CEFTRIAXONE 1 G/1
1 INJECTION, POWDER, FOR SOLUTION INTRAMUSCULAR; INTRAVENOUS
Status: COMPLETED | OUTPATIENT
Start: 2020-06-22 | End: 2020-06-22

## 2020-06-22 RX ORDER — GENTAMICIN SULFATE 40 MG/ML
80 INJECTION, SOLUTION INTRAMUSCULAR; INTRAVENOUS
Status: COMPLETED | OUTPATIENT
Start: 2020-06-22 | End: 2020-06-22

## 2020-06-22 RX ORDER — LIDOCAINE HYDROCHLORIDE 20 MG/ML
JELLY TOPICAL ONCE
Status: COMPLETED | OUTPATIENT
Start: 2020-06-22 | End: 2020-06-22

## 2020-06-22 RX ADMIN — GENTAMICIN SULFATE 80 MG: 40 INJECTION, SOLUTION INTRAMUSCULAR; INTRAVENOUS at 01:06

## 2020-06-22 RX ADMIN — CEFTRIAXONE 1 G: 1 INJECTION, POWDER, FOR SOLUTION INTRAMUSCULAR; INTRAVENOUS at 12:06

## 2020-06-22 RX ADMIN — LIDOCAINE HYDROCHLORIDE 20 ML: 10 INJECTION INFILTRATION; PERINEURAL at 01:06

## 2020-06-22 RX ADMIN — LIDOCAINE HYDROCHLORIDE: 20 JELLY TOPICAL at 12:06

## 2020-06-22 NOTE — PATIENT INSTRUCTIONS

## 2020-06-22 NOTE — PROCEDURES
Erasmo Fitch is a 74 y.o. male patient with abnormal PSA and abnormal RUPA presenting for TRUS biopsy    We confirmed that the patient received their pre-preocedure antibiotic (Rocephin) and that they underwent fleet enema.  After informed consent was obtained, the patient was transferred to the cystoscopy suite and placed in the left lateral decubitus position.     2% lidocaine jelly was introduced into the patient's rectum for local anesthesia.  The US probe was introduced into the rectum and the prostate was identified on ultrasound. Total prostate volume was  23 g. 20 mL of 1% lidocaine was injected into the periprostatic area with good lifting of the prostate from [place] fascia.  Under ultrasound guidance, 14 biopsies were taken two from the right and left, base, mid and apical regions of the prostate as well as a hypoechoic 1.2cm targeted region in the right base. These specimens were sent to pathology for analysis. The ultra sound probe was removed and the procedure was terminated.  IM Gentamycin was then administered.    Transrectal Ultrasound w/ Biopsy    Date/Time: 6/22/2020 1:00 PM  Performed by: Dave Thayer MD  Authorized by: Simba Tamayo MD     Consent Done?:  Yes (Written)  Indications: Prostate Nodules and Elevated PSA    Preparation: Patient was prepped and draped in usual sterile fashion    Position:  Left lateral  Anesthesia:  20cc's 1% Lidocaine  Patient sedated: No    Prostate Size:  23g  Lesions:: Yes         Type:  Hypoechoic  Left Base Biopsies: 2  Left Mid Biopsies: 2  Left [place] Biopsies: 2  Right Base Biopsies: 4  Right Mid Biopsies: 2  Right [place] Biopsies: 2  Total Biopsies:  14    Patient tolerance:  Patient tolerated the procedure well with no immediate complications     Right base hypoechoic lesion target biopsied x2

## 2020-06-24 LAB
FINAL PATHOLOGIC DIAGNOSIS: NORMAL
GROSS: NORMAL

## 2020-06-26 ENCOUNTER — TELEPHONE (OUTPATIENT)
Dept: UROLOGY | Facility: CLINIC | Age: 75
End: 2020-06-26

## 2020-06-26 DIAGNOSIS — C61 PC (PROSTATE CANCER): Primary | ICD-10-CM

## 2020-06-26 NOTE — TELEPHONE ENCOUNTER
I called the patient and reviewed his pathology results.  We will order an MRI of the pelvis in 2 months and then have him return to see me to discuss all of the treatment options for his prostate cancer    We will also fax the pathology results and this note to his primary physician, Dr Heron Howard

## 2020-06-26 NOTE — PROGRESS NOTES
Dr Tamayo called pt with results      Will get MR prostate in 2 months then RTC to discuss options

## 2020-07-01 ENCOUNTER — TELEPHONE (OUTPATIENT)
Dept: UROLOGY | Facility: CLINIC | Age: 75
End: 2020-07-01

## 2020-08-27 ENCOUNTER — TELEPHONE (OUTPATIENT)
Dept: UROLOGY | Facility: CLINIC | Age: 75
End: 2020-08-27

## 2020-08-27 NOTE — TELEPHONE ENCOUNTER
"Appt r/s after MRI per MD. Pt will take Xanax prior to MRI.    Patient has an MRI scheduled 9/1 and  reports he is "very claustrophobic". He says he received injections to help him calm down during MRIs and requests the same. Please advise. Thanks!    ----- Message from Lana Norman MA sent at 8/27/2020  3:22 PM CDT -----    ----- Message -----  From: Humphrey Vasquez  Sent: 8/27/2020   3:17 PM CDT  To: Roni Cottrell Staff     Name of Who is Calling:     What is the request in detail: patient request call back in reference to injection to relax before having mri Please contact to further discuss and advise      Can the clinic reply by [facility]: no     What Number to Call Back if not [facility]:  [phone] ///[phone]         "

## 2020-09-11 ENCOUNTER — HOSPITAL ENCOUNTER (OUTPATIENT)
Dept: RADIOLOGY | Facility: HOSPITAL | Age: 75
Discharge: HOME | End: 2020-09-11
Attending: UROLOGY
Payer: MEDICARE

## 2020-09-11 DIAGNOSIS — C61 PC (PROSTATE CANCER): ICD-10-CM

## 2020-09-11 PROCEDURE — 72197 MRI PELVIS W/O & W/DYE: CPT | Mod: 26,,, | Performed by: RADIOLOGY

## 2020-09-11 PROCEDURE — 72197 MRI PROSTATE W W/O CONTRAST: ICD-10-PCS | Mod: 26,,, | Performed by: RADIOLOGY

## 2020-09-11 PROCEDURE — A9585 GADOBUTROL INJECTION: HCPCS | Performed by: UROLOGY

## 2020-09-11 PROCEDURE — 25500020 PHARM REV CODE 255: Performed by: UROLOGY

## 2020-09-11 PROCEDURE — 72197 MRI PELVIS W/O & W/DYE: CPT | Mod: TC

## 2020-09-11 RX ORDER — GADOBUTROL 604.72 MG/ML
10 INJECTION INTRAVENOUS
Status: COMPLETED | OUTPATIENT
Start: 2020-09-11 | End: 2020-09-11

## 2020-09-11 RX ADMIN — GADOBUTROL 10 ML: 604.72 INJECTION INTRAVENOUS at 01:09

## 2020-09-14 ENCOUNTER — TELEPHONE (OUTPATIENT)
Dept: UROLOGY | Facility: CLINIC | Age: 75
End: 2020-09-14

## 2020-09-14 LAB
CREAT SERPL-MCNC: 1.1 MG/DL (ref 0.5–1.4)
SAMPLE: NORMAL

## 2020-09-14 NOTE — TELEPHONE ENCOUNTER
Patient says he is proceeding with an additional MRI biopsy and will send the results to the office.    ----- Message from Melvin Malagon sent at 9/14/2020  2:18 PM CDT -----  Name of Who is Calling: PEGGY MALONE  What is the request in detail: Patient is calling to discuss mri w/contrast results with nurse. Please advise  Can the clinic reply by [facility]: Yes What Number to Call Back [facility]: [phone]

## 2020-09-29 ENCOUNTER — OFFICE VISIT (OUTPATIENT)
Dept: UROLOGY | Facility: CLINIC | Age: 75
End: 2020-09-29
Payer: MEDICARE

## 2020-09-29 VITALS
BODY MASS INDEX: 25.05 KG/M2 | HEART RATE: 57 BPM | WEIGHT: 175 LBS | DIASTOLIC BLOOD PRESSURE: 89 MMHG | SYSTOLIC BLOOD PRESSURE: 162 MMHG | HEIGHT: 70 IN

## 2020-09-29 DIAGNOSIS — C61 PC (PROSTATE CANCER): Primary | ICD-10-CM

## 2020-09-29 DIAGNOSIS — R97.20 ELEVATED PSA: ICD-10-CM

## 2020-09-29 PROCEDURE — 1159F PR MEDICATION LIST DOCUMENTED IN MEDICAL RECORD: ICD-10-PCS | Mod: S$GLB,,, | Performed by: UROLOGY

## 2020-09-29 PROCEDURE — 3079F DIAST BP 80-89 MM HG: CPT | Mod: CPTII,S$GLB,, | Performed by: UROLOGY

## 2020-09-29 PROCEDURE — 1101F PR PT FALLS ASSESS DOC 0-1 FALLS W/OUT INJ PAST YR: ICD-10-PCS | Mod: CPTII,S$GLB,, | Performed by: UROLOGY

## 2020-09-29 PROCEDURE — 1126F AMNT PAIN NOTED NONE PRSNT: CPT | Mod: S$GLB,,, | Performed by: UROLOGY

## 2020-09-29 PROCEDURE — 99214 OFFICE O/P EST MOD 30 MIN: CPT | Mod: S$GLB,,, | Performed by: UROLOGY

## 2020-09-29 PROCEDURE — 1126F PR PAIN SEVERITY QUANTIFIED, NO PAIN PRESENT: ICD-10-PCS | Mod: S$GLB,,, | Performed by: UROLOGY

## 2020-09-29 PROCEDURE — 3079F PR MOST RECENT DIASTOLIC BLOOD PRESSURE 80-89 MM HG: ICD-10-PCS | Mod: CPTII,S$GLB,, | Performed by: UROLOGY

## 2020-09-29 PROCEDURE — 1101F PT FALLS ASSESS-DOCD LE1/YR: CPT | Mod: CPTII,S$GLB,, | Performed by: UROLOGY

## 2020-09-29 PROCEDURE — 1159F MED LIST DOCD IN RCRD: CPT | Mod: S$GLB,,, | Performed by: UROLOGY

## 2020-09-29 PROCEDURE — 3077F PR MOST RECENT SYSTOLIC BLOOD PRESSURE >= 140 MM HG: ICD-10-PCS | Mod: CPTII,S$GLB,, | Performed by: UROLOGY

## 2020-09-29 PROCEDURE — 3077F SYST BP >= 140 MM HG: CPT | Mod: CPTII,S$GLB,, | Performed by: UROLOGY

## 2020-09-29 PROCEDURE — 99214 PR OFFICE/OUTPT VISIT, EST, LEVL IV, 30-39 MIN: ICD-10-PCS | Mod: S$GLB,,, | Performed by: UROLOGY

## 2020-09-29 NOTE — PROGRESS NOTES
"Subjective:      Erasmo Fitch is a 75 y.o. male who returns today regarding his     Patient had MRI guided follow-up biopsy with Dr. herrera.  This showed [place] 7 4+3 adenocarcinoma.  He has chosen to have surgery with Dr. herrera    The following portions of the patient's history were reviewed and updated as appropriate: allergies, current medications, past family history, past medical history, past social history, past surgical history and problem list.    Review of Systems  Pertinent items are noted in HPI.  A comprehensive multipoint review of systems was negative except as otherwise stated in the HPI.    Past Medical History:   Diagnosis Date    Basal cell carcinoma of forearm 2003    Basal cell carcinoma of forehead 2013    Cancer     GERD (gastroesophageal reflux disease)     Headache(784.0)     Hypertension     Retroperitoneal bleed 1980    Right bundle branch block     Syncope and collapse     Many years ago while on a ladder hanging [employer]. "I got hot"     Past Surgical History:   Procedure Laterality Date    cervical spine fusion      HIP FRACTURE SURGERY Right 1993    Left shoulder surgery      SHOULDER ARTHROSCOPY W/ ROTATOR CUFF REPAIR Right 2014    SHOULDER ARTHROSCOPY W/ ROTATOR CUFF REPAIR Left 2013    TRANSURETHRAL RESECTION OF PROSTATE N/A 2014       Review of patient's allergies indicates:   Allergen Reactions    Ciprofloxacin (bulk)     Seconal [secobarbital sodium] Other (See Comments)     hallucinations          Objective:   Vitals: BP (!) 162/89 (BP Location: Left arm, Patient Position: Sitting, BP Method: Large (Automatic))   Pulse (!) 57   Ht 5' 10" (1.778 m)   Wt 79.4 kg (175 lb)   BMI 25.11 kg/m²     Physical Exam   All counseling    Physical Exam    Lab Review   Urinalysis demonstrates no specimen    Lab Results   Component Value Date    WBC 7.26 03/25/2019    HGB 14.7 03/25/2019    HGB 14.7 03/25/2019    HCT 44.3 03/25/2019    MCV 92 03/25/2019     03/25/2019 "     Lab Results   Component Value Date    CREATININE 0.9 03/25/2019    BUN 15 03/25/2019     No results found for: PSA, PSADIAG, PSATOTAL, PSAFREE, PSAFREEPCT     Final Pathologic Diagnosis 1.  PROSTATE, LEFT APEX, BIOPSY:   - Prostatic adenocarcinoma, [place] score 3+3=6, Grade Group 1, involving 17%   of tissue submitted (2 of 2 cores).   2.  PROSTATE, LEFT MIDDLE, BIOPSY:   - Prostatic adenocarcinoma, [place] score 3+3=6, Grade Group 1, involving 45%   of tissue submitted (2 of 2 cores).   3. PROSTATE, LEFT BASE, BIOPSY:   - Prostatic adenocarcinoma, [place] score 3+3=6, Grade Group 1, involving 58%   of tissue submitted (2 of 2 cores).   - Perineural invasion is present.   4.  PROSTATE, RIGHT APEX, BIOPSY:   - High-grade prostatic intraepithelial neoplasia (HGPIN), focal.   5.  PROSTATE, RIGHT MIDDLE, BIOPSY:   - Negative for carcinoma.   - Benign prostatic tissue with focal chronic inflammation.   6.  PROSTATE, RIGHT BASE, BIOPSY:   - Negative for carcinoma.   - Benign prostatic tissue, mostly fibrous stroma, with basal cell hyperplasia.   7.  PROSTATE, NODULE, BIOPSY:   - Prostatic carcinoma, [place]am score 3+3=6, Grade Group 1, involving 4% of   tissue submitted (1 of 2 cores).    Comment: Interpreted by: Luisana Esqueda MD, Signed on 06/24/2020 at 17:41     Uronav biopsy  Dr Birch 7 4+3 at target    Imaging  MR  Neurovascular bundle: Unremarkable.     Seminal vesicles:     SV invasion:Unremarkable.     Adjacent Organ Involvement: There is no focal bladder wall thickening. There is no rectal involvement.     Lymphadenopathy: None.     Other Findings: No marrow signal abnormality to suggest an infiltrative process.  There are multiple colonic diverticuli.     Impression:     1. PI-RADS 4 and 5 lesions.  No MR imaging findings to suggest extraprostatic extension.  2. Status post TURP.  Overall Assessment:     PIRADS 5 (clinically significant cancer is highly likely to be present)     Number of targets  created for potential MR/US fusion biopsy     Peripheral zone: 1     Transition zone: 1        Electronically signed by: Jax Garcia MD  Date:                                            09/11/2020  Time:                                           14:36    TRUS 23g    Assessment and Plan:   PC (prostate cancer)  Tram 7 [id number]      We discussed his grade and stage of disease, life expectancy and active surveillance vs treatment.  We discussed the roles of surgery, radiation (external and brachytherapy), HIFU and Cryosurgery, hormonal therapy and chemotherapy in the treatment of prostate cancer.  We discussed brachytherapy and external radiation therapy and open and robotic surgery.  We discussed the risks and benefits of each. We discussed erectile dysfunction, urinary incontinence and bowel issues.  We discussed referral to radiation oncology to further discuss options.  I answered his questions.      Elevated PSA      He has chosen to have surgery with Dr Duran  Will tranfer care to Dr Farfan

## 2020-11-19 ENCOUNTER — TELEPHONE (OUTPATIENT)
Dept: NEUROSURGERY | Facility: CLINIC | Age: 75
End: 2020-11-19

## 2020-11-19 DIAGNOSIS — M54.12 RADICULOPATHY, CERVICAL REGION: ICD-10-CM

## 2020-11-19 NOTE — TELEPHONE ENCOUNTER
Patient would like to reschedule his surgery that was scheduled last year. Would you need a new MRI and will you need to see him?

## 2020-12-01 ENCOUNTER — HOSPITAL ENCOUNTER (OUTPATIENT)
Dept: RADIATION THERAPY | Facility: HOSPITAL | Age: 75
Discharge: HOME | End: 2020-12-01
Attending: RADIOLOGY
Payer: MEDICARE

## 2020-12-02 ENCOUNTER — INITIAL CONSULT (OUTPATIENT)
Dept: RADIATION ONCOLOGY | Facility: CLINIC | Age: 75
End: 2020-12-02
Attending: RADIOLOGY
Payer: MEDICARE

## 2020-12-02 VITALS
DIASTOLIC BLOOD PRESSURE: 79 MMHG | BODY MASS INDEX: 25.15 KG/M2 | HEIGHT: 70 IN | SYSTOLIC BLOOD PRESSURE: 125 MMHG | WEIGHT: 175.69 LBS | HEART RATE: 80 BPM | RESPIRATION RATE: 16 BRPM

## 2020-12-02 DIAGNOSIS — C61 CANCER OF PROSTATE: Primary | ICD-10-CM

## 2020-12-02 PROCEDURE — 1159F PR MEDICATION LIST DOCUMENTED IN MEDICAL RECORD: ICD-10-PCS | Mod: S$GLB,,, | Performed by: RADIOLOGY

## 2020-12-02 PROCEDURE — 3078F DIAST BP <80 MM HG: CPT | Mod: CPTII,S$GLB,, | Performed by: RADIOLOGY

## 2020-12-02 PROCEDURE — 3074F SYST BP LT 130 MM HG: CPT | Mod: CPTII,S$GLB,, | Performed by: RADIOLOGY

## 2020-12-02 PROCEDURE — 99204 OFFICE O/P NEW MOD 45 MIN: CPT | Mod: S$GLB,,, | Performed by: RADIOLOGY

## 2020-12-02 PROCEDURE — 3078F PR MOST RECENT DIASTOLIC BLOOD PRESSURE < 80 MM HG: ICD-10-PCS | Mod: CPTII,S$GLB,, | Performed by: RADIOLOGY

## 2020-12-02 PROCEDURE — 99999 PR PBB SHADOW E&M-EST. PATIENT-LVL III: ICD-10-PCS | Mod: PBBFAC,,, | Performed by: RADIOLOGY

## 2020-12-02 PROCEDURE — 3074F PR MOST RECENT SYSTOLIC BLOOD PRESSURE < 130 MM HG: ICD-10-PCS | Mod: CPTII,S$GLB,, | Performed by: RADIOLOGY

## 2020-12-02 PROCEDURE — 99204 PR OFFICE/OUTPT VISIT, NEW, LEVL IV, 45-59 MIN: ICD-10-PCS | Mod: S$GLB,,, | Performed by: RADIOLOGY

## 2020-12-02 PROCEDURE — 1159F MED LIST DOCD IN RCRD: CPT | Mod: S$GLB,,, | Performed by: RADIOLOGY

## 2020-12-02 PROCEDURE — 99999 PR PBB SHADOW E&M-EST. PATIENT-LVL III: CPT | Mod: PBBFAC,,, | Performed by: RADIOLOGY

## 2020-12-02 RX ORDER — ALPRAZOLAM 1 MG/1
TABLET ORAL
COMMUNITY
Start: 2020-11-24 | End: 2022-05-11

## 2020-12-02 RX ORDER — IBUPROFEN 800 MG/1
800 TABLET ORAL
COMMUNITY
Start: 2019-11-05 | End: 2020-12-02 | Stop reason: SDUPTHER

## 2020-12-02 NOTE — LETTER
December 2, 2020      Simba Tamayo MD  [address]  [address]  [place] [facility] [place] [address]           [facility]  [address]  [facility] [address]  Phone: [phone]          Patient: Erasmo Fitch   MR Number: [id number]   YOB: 1945   Date of Visit: 12/2/2020       Dear Dr. Simba Tamayo:    Thank you for referring Erasmo Fitch to me for evaluation. Attached you will find relevant portions of my assessment and plan of care.    If you have questions, please do not hesitate to call me. I look forward to following Erasmo Fitch along with you.    Sincerely,    Luís Naranjo Jr., MD    Enclosure  CC:  No Recipients    If you would like to receive this communication electronically, please contact [email or web address] or [phone] to request more information on [employer] access.    For providers and/or their staff who would like to refer a patient to [facility], please contact us through our one-stop-shop provider referral line, [facility], at [phone].    If you feel you have received this communication in error or would no longer like to receive these types of communications, please e-mail [email or web address]

## 2020-12-02 NOTE — H&P (VIEW-ONLY)
HISTORY OF PRESENT ILLNESS:   This patient presents for discussion of treatment options for a recently diagnosed prostate cancer.      Mr. Fitch has a history of BPH and is status post TURP 2014 with no complaints since that time.  He self referred to Dr. Tamayo for evaluation of an increasing PSA of 5.3 with 11% free PSA drawn in May of 2020.  RUPA was negative.  TRUS and biopsy on 6/22/20 revealed a 23 g prostate with a hypoechoic area noted at the Rt. base.  Biopsies from the Lt. apex, Lt. mid gland, Lt. base and the hypoechoic area at the Rt. base revealed [place] 6 (3+3) adenocarcinoma.  The tumor involved a total of 7 of 14 cores.  Tumor involved 45 - 58% of the cores from the Lt. mid gland and Lt. base and 17% of cores from the Lt. apex.  Tumor involved 4% of the cores from the hypoechoic area.  Further work up with MRI of the prostate on 9/11/20 revealed a 4. 8 x 2.8 x 3.1cm prostate corresponding to a computed volume of 20cc.  There was a 2 cm T2 hypointense lesion at the Lt. base/mid gland, PI-RADS 5.  There was no extraprostatic extension.  There was a second 9 mm T2 hypointense lesion at the Rt. apex, PI-RADS 4.  There was no extraprostatic extension.  The neurovascular bundles and seminal vesicles were unremarkable.  There was no lymphadenopathy.   The patient did undergo repeat MRI guided biopsies with Dr. Duran. Biopsies from the MRI lesions again revealed adenocarcinoma but the [place] score was felt to be 7 (4+3).  The patient received a second virtual opinion from [facility] which included a pathology review of his [facility] and [facility] biopsies.  The consensus from both [facility] and [facility] was that his disease was [place] 6 (3+3).  He not presents for discussion of possible treatment.  Today the patient states he feels well.       REVIEW OF SYSTEMS:   Review of Systems   Constitutional: Negative for chills, fever and weight loss.   Gastrointestinal: Negative for abdominal  "pain, constipation and diarrhea.   Genitourinary: Negative for dysuria, frequency and urgency.         PAST MEDICAL HISTORY:  Past Medical History:   Diagnosis Date    Basal cell carcinoma of forearm 2003    Basal cell carcinoma of forehead 2013    Cancer     GERD (gastroesophageal reflux disease)     Headache(784.0)     Hypertension     Retroperitoneal bleed 1980    Right bundle branch block     Syncope and collapse     Many years ago while on a ladder hanging [email or web address]. "I got hot"       PAST SURGICAL HISTORY:  Past Surgical History:   Procedure Laterality Date    cervical spine fusion      HIP FRACTURE SURGERY Right 1993    Left shoulder surgery      SHOULDER ARTHROSCOPY W/ ROTATOR CUFF REPAIR Right 2014    SHOULDER ARTHROSCOPY W/ ROTATOR CUFF REPAIR Left 2013    TRANSURETHRAL RESECTION OF PROSTATE N/A 2014       ALLERGIES:   Review of patient's allergies indicates:   Allergen Reactions    Ciprofloxacin (bulk)     Seconal [secobarbital sodium] Other (See Comments)     hallucinations       MEDICATIONS:  Current Outpatient Medications   Medication Sig    aspirin (ECOTRIN) 81 MG EC tablet Take 81 mg by mouth once daily.    celecoxib (CELEBREX) 400 MG capsule TAKE 1 CAPSULE (400 MG TOTAL) BY MOUTH 2 (TWO) TIMES DAILY.    cyclobenzaprine HCl (FLEXERIL ORAL) Take 5 mg by mouth.    diazePAM (VALIUM) 5 MG tablet Take 1-2 tablets (5-10 mg total) by mouth once as needed (one hour before MRI).    folic acid/multivit-min/lutein (CENTRUM SILVER ORAL) Take by mouth.    ibuprofen (ADVIL,MOTRIN) 800 MG tablet Take 800 mg by mouth every 6 (six) hours as needed for Pain.    loperamide (IMODIUM) 2 mg capsule Take 2 mg by mouth 4 (four) times daily as needed for Diarrhea.    mag/aluminum/sod bicarb/alginc (GAVISCON ORAL) Take by mouth.    neomycin-polymyxin-dexamethasone (MAXITROL) 3.5mg/mL-10,000 unit/mL-0.1 % DrpS INSTILL 1 DROP INTO BOTH EYES 3 TIMES A DAY    pantoprazole (PROTONIX) 40 MG tablet 2 (two) " times daily.     triamterene-hydrochlorothiazide 37.5-25 mg (DYAZIDE) 37.5-25 mg per capsule Take 1 capsule by mouth every morning.    vitamin D (VITAMIN D3) 1000 units Tab Take 2,000 Units by mouth once daily.     No current facility-administered medications for this visit.        SOCIAL HISTORY:  Social History     Socioeconomic History    Marital status:      Spouse name: Not on file    Number of children: Not on file    Years of education: Not on file    Highest education level: Not on file   Occupational History    Not on file   Social Needs    Financial resource strain: Not on file    Food insecurity     Worry: Not on file     Inability: Not on file    Transportation needs     Medical: Not on file     Non-medical: Not on file   Tobacco Use    Smoking status: Never Smoker    Smokeless tobacco: Never Used   Substance and Sexual Activity    Alcohol use: Yes     Alcohol/week: 6.0 standard drinks     Types: 6 Cans of beer per week     Comment: occ.    Drug use: No    Sexual activity: Not on file   Lifestyle    Physical activity     Days per week: Not on file     Minutes per session: Not on file    Stress: Not on file   Relationships    Social connections     Talks on phone: Not on file     Gets together: Not on file     Attends Judaism service: Not on file     Active member of club or organization: Not on file     Attends meetings of clubs or organizations: Not on file     Relationship status: Not on file   Other Topics Concern    Not on file   Social History Narrative    Not on file       FAMILY HISTORY:  Family History   Problem Relation Age of Onset    Heart disease Father     Heart attack Father     Heart disease Brother     Heart attack Brother          PHYSICAL EXAMINATION:  Vitals:    12/02/20 0941   BP: 125/79   Pulse: 80   Resp: 16     Physical Exam   Constitutional: He is oriented to person, place, and time and well-developed, well-nourished, and in no distress.    Pulmonary/Chest: Effort normal. No respiratory distress.   Abdominal: Soft. He exhibits no distension.   Neurological: He is alert and oriented to person, place, and time.   Psychiatric: Mood, affect and judgment normal.       ASSESSMENT/PLAN:  Stage I (cT1c, cN0, cM0, PSA: 5.3, Grade Group: 1) adenocarcinoma of the prostate.       ECO    I had a long discussion with the patient and his wife.  We had a long discussion reviewing his presentation and risk group.  Given the pathology review, I explained it is likely he has low risk prostate cancer.  We discussed the implications of this classification.  Discussed the options of active surveillance vs definitive radiotherapy to the prostate either with external beam therapy or possible prostate brachytherapy.  Discussed the procedures, risks and benefits of radiotherapy.  Discussed the acute and long term side effects.  We also discussed the possible difficulties in implanting a prostate after TURP.  After our discussion of the pros and cons, the patient felt he would like to proceed with prostate brachytherapy using Palladium 103 seeds.  Will plan to schedule treatment in the next few weeks.  Thank you for allowing us to participate in the care of this patient.        I spent approximately 60 minutes reviewing the available records and evaluating the patient, out of which over 50% of the time was spent face to face with the patient in counseling and coordinating this patient's care.

## 2020-12-02 NOTE — PROGRESS NOTES
HISTORY OF PRESENT ILLNESS:   This patient presents for discussion of treatment options for a recently diagnosed prostate cancer.      Mr. Fitch has a history of BPH and is status post TURP 2014 with no complaints since that time.  He self referred to Dr. Tamayo for evaluation of an increasing PSA of 5.3 with 11% free PSA drawn in May of 2020.  RUPA was negative.  TRUS and biopsy on 6/22/20 revealed a 23 g prostate with a hypoechoic area noted at the Rt. base.  Biopsies from the Lt. apex, Lt. mid gland, Lt. base and the hypoechoic area at the Rt. base revealed Batesland 6 (3+3) adenocarcinoma.  The tumor involved a total of 7 of 14 cores.  Tumor involved 45 - 58% of the cores from the Lt. mid gland and Lt. base and 17% of cores from the Lt. apex.  Tumor involved 4% of the cores from the hypoechoic area.  Further work up with MRI of the prostate on 9/11/20 revealed a 4. 8 x 2.8 x 3.1cm prostate corresponding to a computed volume of 20cc.  There was a 2 cm T2 hypointense lesion at the Lt. base/mid gland, PI-RADS 5.  There was no extraprostatic extension.  There was a second 9 mm T2 hypointense lesion at the Rt. apex, PI-RADS 4.  There was no extraprostatic extension.  The neurovascular bundles and seminal vesicles were unremarkable.  There was no lymphadenopathy.   The patient did undergo repeat MRI guided biopsies with Dr. Duran. Biopsies from the MRI lesions again revealed adenocarcinoma but the Batesland score was felt to be 7 (4+3).  The patient received a second virtual opinion from [facility] which included a pathology review of his [facility] and [facility] biopsies.  The consensus from both [facility] and [facility] was that his disease was Batesland 6 (3+3).  He not presents for discussion of possible treatment.  Today the patient states he feels well.       REVIEW OF SYSTEMS:   Review of Systems   Constitutional: Negative for chills, fever and weight loss.   Gastrointestinal: Negative for abdominal  "pain, constipation and diarrhea.   Genitourinary: Negative for dysuria, frequency and urgency.         PAST MEDICAL HISTORY:  Past Medical History:   Diagnosis Date    Basal cell carcinoma of forearm 2003    Basal cell carcinoma of forehead 2013    Cancer     GERD (gastroesophageal reflux disease)     Headache(784.0)     Hypertension     Retroperitoneal bleed 1980    Right bundle branch block     Syncope and collapse     Many years ago while on a ladder hanging [place]. "I got hot"       PAST SURGICAL HISTORY:  Past Surgical History:   Procedure Laterality Date    cervical spine fusion      HIP FRACTURE SURGERY Right 1993    Left shoulder surgery      SHOULDER ARTHROSCOPY W/ ROTATOR CUFF REPAIR Right 2014    SHOULDER ARTHROSCOPY W/ ROTATOR CUFF REPAIR Left 2013    TRANSURETHRAL RESECTION OF PROSTATE N/A 2014       ALLERGIES:   Review of patient's allergies indicates:   Allergen Reactions    Ciprofloxacin (bulk)     Seconal [secobarbital sodium] Other (See Comments)     hallucinations       MEDICATIONS:  Current Outpatient Medications   Medication Sig    aspirin (ECOTRIN) 81 MG EC tablet Take 81 mg by mouth once daily.    celecoxib (CELEBREX) 400 MG capsule TAKE 1 CAPSULE (400 MG TOTAL) BY MOUTH 2 (TWO) TIMES DAILY.    cyclobenzaprine HCl (FLEXERIL ORAL) Take 5 mg by mouth.    diazePAM (VALIUM) 5 MG tablet Take 1-2 tablets (5-10 mg total) by mouth once as needed (one hour before MRI).    folic acid/multivit-min/lutein (CENTRUM SILVER ORAL) Take by mouth.    ibuprofen (ADVIL,MOTRIN) 800 MG tablet Take 800 mg by mouth every 6 (six) hours as needed for Pain.    loperamide (IMODIUM) 2 mg capsule Take 2 mg by mouth 4 (four) times daily as needed for Diarrhea.    mag/aluminum/sod bicarb/alginc (GAVISCON ORAL) Take by mouth.    neomycin-polymyxin-dexamethasone (MAXITROL) 3.5mg/mL-10,000 unit/mL-0.1 % DrpS INSTILL 1 DROP INTO BOTH EYES 3 TIMES A DAY    pantoprazole (PROTONIX) 40 MG tablet 2 (two) " times daily.     triamterene-hydrochlorothiazide 37.5-25 mg (DYAZIDE) 37.5-25 mg per capsule Take 1 capsule by mouth every morning.    vitamin D (VITAMIN D3) 1000 units Tab Take 2,000 Units by mouth once daily.     No current facility-administered medications for this visit.        SOCIAL HISTORY:  Social History     Socioeconomic History    Marital status:      Spouse name: Not on file    Number of children: Not on file    Years of education: Not on file    Highest education level: Not on file   Occupational History    Not on file   Social Needs    Financial resource strain: Not on file    Food insecurity     Worry: Not on file     Inability: Not on file    Transportation needs     Medical: Not on file     Non-medical: Not on file   Tobacco Use    Smoking status: Never Smoker    Smokeless tobacco: Never Used   Substance and Sexual Activity    Alcohol use: Yes     Alcohol/week: 6.0 standard drinks     Types: 6 Cans of beer per week     Comment: occ.    Drug use: No    Sexual activity: Not on file   Lifestyle    Physical activity     Days per week: Not on file     Minutes per session: Not on file    Stress: Not on file   Relationships    Social connections     Talks on phone: Not on file     Gets together: Not on file     Attends Sikhism service: Not on file     Active member of club or organization: Not on file     Attends meetings of clubs or organizations: Not on file     Relationship status: Not on file   Other Topics Concern    Not on file   Social History Narrative    Not on file       FAMILY HISTORY:  Family History   Problem Relation Age of Onset    Heart disease Father     Heart attack Father     Heart disease Brother     Heart attack Brother          PHYSICAL EXAMINATION:  Vitals:    12/02/20 0941   BP: 125/79   Pulse: 80   Resp: 16     Physical Exam   Constitutional: He is oriented to person, place, and time and well-developed, well-nourished, and in no distress.    Pulmonary/Chest: Effort normal. No respiratory distress.   Abdominal: Soft. He exhibits no distension.   Neurological: He is alert and oriented to person, place, and time.   Psychiatric: Mood, affect and judgment normal.       ASSESSMENT/PLAN:  Stage I (cT1c, cN0, cM0, PSA: 5.3, Grade Group: 1) adenocarcinoma of the prostate.       ECO    I had a long discussion with the patient and his wife.  We had a long discussion reviewing his presentation and risk group.  Given the pathology review, I explained it is likely he has low risk prostate cancer.  We discussed the implications of this classification.  Discussed the options of active surveillance vs definitive radiotherapy to the prostate either with external beam therapy or possible prostate brachytherapy.  Discussed the procedures, risks and benefits of radiotherapy.  Discussed the acute and long term side effects.  We also discussed the possible difficulties in implanting a prostate after TURP.  After our discussion of the pros and cons, the patient felt he would like to proceed with prostate brachytherapy using Palladium 103 seeds.  Will plan to schedule treatment in the next few weeks.  Thank you for allowing us to participate in the care of this patient.        I spent approximately 60 minutes reviewing the available records and evaluating the patient, out of which over 50% of the time was spent face to face with the patient in counseling and coordinating this patient's care.

## 2020-12-09 ENCOUNTER — TELEPHONE (OUTPATIENT)
Dept: UROLOGY | Facility: CLINIC | Age: 75
End: 2020-12-09

## 2020-12-09 DIAGNOSIS — C61 PC (PROSTATE CANCER): Primary | ICD-10-CM

## 2020-12-09 DIAGNOSIS — C61 PROSTATE CANCER: ICD-10-CM

## 2020-12-14 ENCOUNTER — TELEPHONE (OUTPATIENT)
Dept: PREADMISSION TESTING | Facility: HOSPITAL | Age: 75
End: 2020-12-14

## 2020-12-14 NOTE — ANESTHESIA PAT ROS NOTE
12/14/2020  Erasmo Fitch is a 75 y.o., male.      Pre-op Assessment          Review of Systems  Anesthesia Hx:  No problems with previous Anesthesia  History of prior surgery of interest to airway management or planning: Previous anesthesia: MAC  9/18/2020 Prostate Biopsy with MAC.  Denies Family Hx of Anesthesia complications.   Denies Personal Hx of Anesthesia complications.   Social:  Social Alcohol Use, Non-Smoker    Hematology/Oncology:  Hematology Normal      Oncology Comments: Prostate CA    EENT/Dental:   Ears General/Symptom(s) Tinnitus   Cardiovascular:   Denies MI.   Denies CABG/stent.   Denies Angina.  Functional Capacity good / => 4 METS  Hypertension, Essential Hypertension , Well Controlled on Rx  Disorder of Cardiac Rhythm (Right BBB)    Pulmonary:   Denies Asthma.  Denies Shortness of breath.  Denies Recent URI.    Renal/:  Renal/ Normal     Hepatic/GI:   GERD, well controlled    Musculoskeletal:  Cervical Spine Disorder, Cervical Disc Disease, Radiculopathy, S/P Cervical Fusion, Cervical Spine Limited Mobility    Neurological:   Denies CVA. Denies Seizures.    Endocrine:  Endocrine Normal    Psych:  Psychiatric Normal              Anesthesia Assessment: Preoperative EQUATION    Planned Procedure: Procedure(s) (LRB):  INSERTION, RADIOACTIVE SEED (N/A)  Requested Anesthesia Type:Monitor Anesthesia Care  Surgeon: Abhinav Cunha MD  Service: Urology  Known or anticipated Date of Surgery:12/21/2020    Surgeon notes: No note to review    Electronic QUestionnaire Assessment completed via nurse interview with patient.        Triage considerations:     The patient has no apparent active cardiac condition (No unstable coronary Syndrome such as severe unstable angina or recent [<1 month] myocardial infarction, decompensated CHF, severe valvular   disease or significant arrhythmia)    Previous  anesthesia records:MAC and No problems   9/18/2020 Prostate Biopsy ([facility])  MAC/ASA2    Last PCP note: outside [facility]   Subspecialty notes: Neurosurgery, Radiology/ONC, Urology    Other important co-morbidities: GERD, HTN and Right BBB, Cervical Spinal Fusion, Prostate CA      Tests already available:  No recent tests.             Instructions given. (See in Nurse's note)    Optimization:  Anesthesia Preop Clinic Assessment  Indicated: Not required for this procedure.      Plan:    Testing:  BMP and EKG     Patient  has previously scheduled Medical Appointment: Not at this time prior to surgery    Navigation: Tests Scheduled.              Results will be tracked by Preop Clinic.    12/16/2020 Lab results noted.  EKG marked for MD review.  12/17/2020 Lab/EKG results reviewed per Dr. Pineda.

## 2020-12-14 NOTE — TELEPHONE ENCOUNTER
----- Message from Annemarie Lowery RN sent at 12/14/2020 12:50 PM CST -----  Surgery date: 12/21/2020  PreOp appt: N/A    Please call Pt and schedule the following preop appts:    Lab  EKG    Thank you!  Annemarie

## 2020-12-15 ENCOUNTER — HOSPITAL ENCOUNTER (OUTPATIENT)
Dept: CARDIOLOGY | Facility: CLINIC | Age: 75
Discharge: HOME | End: 2020-12-15
Payer: MEDICARE

## 2020-12-15 DIAGNOSIS — Z01.818 PREOPERATIVE TESTING: ICD-10-CM

## 2020-12-15 PROCEDURE — 93010 EKG 12-LEAD: ICD-10-PCS | Mod: S$GLB,,, | Performed by: INTERNAL MEDICINE

## 2020-12-15 PROCEDURE — 93005 ELECTROCARDIOGRAM TRACING: CPT | Mod: S$GLB,,, | Performed by: ANESTHESIOLOGY

## 2020-12-15 PROCEDURE — 93010 ELECTROCARDIOGRAM REPORT: CPT | Mod: S$GLB,,, | Performed by: INTERNAL MEDICINE

## 2020-12-15 PROCEDURE — 93005 EKG 12-LEAD: ICD-10-PCS | Mod: S$GLB,,, | Performed by: ANESTHESIOLOGY

## 2020-12-16 ENCOUNTER — PATIENT MESSAGE (OUTPATIENT)
Dept: UROLOGY | Facility: CLINIC | Age: 75
End: 2020-12-16

## 2020-12-17 ENCOUNTER — LAB VISIT (OUTPATIENT)
Dept: INTERNAL MEDICINE | Facility: CLINIC | Age: 75
End: 2020-12-17
Payer: MEDICARE

## 2020-12-17 DIAGNOSIS — C61 PC (PROSTATE CANCER): ICD-10-CM

## 2020-12-17 DIAGNOSIS — C61 PROSTATE CANCER: ICD-10-CM

## 2020-12-17 PROCEDURE — 77295 PR 3D RADIOTHERAPY PLAN: ICD-10-PCS | Mod: 26,,, | Performed by: RADIOLOGY

## 2020-12-17 PROCEDURE — U0003 INFECTIOUS AGENT DETECTION BY NUCLEIC ACID (DNA OR RNA); SEVERE ACUTE RESPIRATORY SYNDROME CORONAVIRUS 2 (SARS-COV-2) (CORONAVIRUS DISEASE [COVID-19]), AMPLIFIED PROBE TECHNIQUE, MAKING USE OF HIGH THROUGHPUT TECHNOLOGIES AS DESCRIBED BY CMS-2020-01-R: HCPCS

## 2020-12-17 PROCEDURE — 77295 3-D RADIOTHERAPY PLAN: CPT | Mod: 26,,, | Performed by: RADIOLOGY

## 2020-12-17 PROCEDURE — 77295 3-D RADIOTHERAPY PLAN: CPT | Mod: TC | Performed by: RADIOLOGY

## 2020-12-18 ENCOUNTER — PATIENT MESSAGE (OUTPATIENT)
Dept: UROLOGY | Facility: CLINIC | Age: 75
End: 2020-12-18

## 2020-12-18 ENCOUNTER — TELEPHONE (OUTPATIENT)
Dept: UROLOGY | Facility: CLINIC | Age: 75
End: 2020-12-18

## 2020-12-18 LAB — SARS-COV-2 RNA RESP QL NAA+PROBE: NOT DETECTED

## 2020-12-21 ENCOUNTER — HOSPITAL ENCOUNTER (OUTPATIENT)
Facility: HOSPITAL | Age: 75
Discharge: HOME | End: 2020-12-21
Attending: UROLOGY | Admitting: UROLOGY
Payer: MEDICARE

## 2020-12-21 ENCOUNTER — ANESTHESIA (OUTPATIENT)
Dept: SURGERY | Facility: HOSPITAL | Age: 75
End: 2020-12-21
Payer: MEDICARE

## 2020-12-21 ENCOUNTER — HOSPITAL ENCOUNTER (OUTPATIENT)
Dept: RADIOLOGY | Facility: HOSPITAL | Age: 75
Discharge: HOME | End: 2020-12-21
Attending: UROLOGY | Admitting: UROLOGY
Payer: MEDICARE

## 2020-12-21 ENCOUNTER — ANESTHESIA EVENT (OUTPATIENT)
Dept: SURGERY | Facility: HOSPITAL | Age: 75
End: 2020-12-21
Payer: MEDICARE

## 2020-12-21 VITALS
SYSTOLIC BLOOD PRESSURE: 161 MMHG | OXYGEN SATURATION: 99 % | HEART RATE: 69 BPM | BODY MASS INDEX: 25.4 KG/M2 | DIASTOLIC BLOOD PRESSURE: 97 MMHG | RESPIRATION RATE: 18 BRPM | WEIGHT: 177 LBS | TEMPERATURE: 98 F

## 2020-12-21 DIAGNOSIS — C61 PROSTATE CANCER: ICD-10-CM

## 2020-12-21 DIAGNOSIS — Z01.818 PREOPERATIVE TESTING: Primary | ICD-10-CM

## 2020-12-21 LAB — SARS-COV-2 RDRP RESP QL NAA+PROBE: NEGATIVE

## 2020-12-21 PROCEDURE — C2640 BRACHYTX, STRANDED, P-103: HCPCS | Performed by: RADIOLOGY

## 2020-12-21 PROCEDURE — 63600175 PHARM REV CODE 636 W HCPCS: Performed by: NURSE ANESTHETIST, CERTIFIED REGISTERED

## 2020-12-21 PROCEDURE — 36000704 HC OR TIME LEV I 1ST 15 MIN: Performed by: UROLOGY

## 2020-12-21 PROCEDURE — 77300 RADIATION THERAPY DOSE PLAN: CPT | Mod: TC | Performed by: RADIOLOGY

## 2020-12-21 PROCEDURE — 71000044 HC DOSC ROUTINE RECOVERY FIRST HOUR: Performed by: UROLOGY

## 2020-12-21 PROCEDURE — 25000003 PHARM REV CODE 250: Performed by: NURSE ANESTHETIST, CERTIFIED REGISTERED

## 2020-12-21 PROCEDURE — 36000705 HC OR TIME LEV I EA ADD 15 MIN: Performed by: UROLOGY

## 2020-12-21 PROCEDURE — 77263 PR  RADIATION THERAPY PLAN COMPLEX: ICD-10-PCS | Mod: ,,, | Performed by: RADIOLOGY

## 2020-12-21 PROCEDURE — 77470 SPECIAL RADIATION TREATMENT: CPT | Mod: 26,59,, | Performed by: RADIOLOGY

## 2020-12-21 PROCEDURE — 77470 SPECIAL RADIATION TREATMENT: CPT | Mod: 59,TC | Performed by: RADIOLOGY

## 2020-12-21 PROCEDURE — 77790 RADIATION HANDLING: CPT | Mod: TC | Performed by: RADIOLOGY

## 2020-12-21 PROCEDURE — 77778 PR 77778 - APPLY INTERSTITIAL RADIATION COMPLEX: ICD-10-PCS | Mod: 26,,, | Performed by: RADIOLOGY

## 2020-12-21 PROCEDURE — 71000016 HC POSTOP RECOV ADDL HR: Performed by: UROLOGY

## 2020-12-21 PROCEDURE — 37000008 HC ANESTHESIA 1ST 15 MINUTES: Performed by: UROLOGY

## 2020-12-21 PROCEDURE — 77470 PR  SPECIAL RADIATION TREATMENT: ICD-10-PCS | Mod: 26,59,, | Performed by: RADIOLOGY

## 2020-12-21 PROCEDURE — 76965 CHG SONO GUIDE RADIOELEMT APPLIC: ICD-10-PCS | Mod: 26,,, | Performed by: UROLOGY

## 2020-12-21 PROCEDURE — D9220A PRA ANESTHESIA: Mod: ANES,,, | Performed by: ANESTHESIOLOGY

## 2020-12-21 PROCEDURE — D9220A PRA ANESTHESIA: ICD-10-PCS | Mod: ANES,,, | Performed by: ANESTHESIOLOGY

## 2020-12-21 PROCEDURE — 76965 ECHO GUIDANCE RADIOTHERAPY: CPT | Mod: 26,,, | Performed by: UROLOGY

## 2020-12-21 PROCEDURE — 63600175 PHARM REV CODE 636 W HCPCS: Performed by: STUDENT IN AN ORGANIZED HEALTH CARE EDUCATION/TRAINING PROGRAM

## 2020-12-21 PROCEDURE — 55875 PR PERCUT/NEEDLE INSERT,PROSTATE,RADIOISOT: ICD-10-PCS | Mod: ,,, | Performed by: UROLOGY

## 2020-12-21 PROCEDURE — 25000003 PHARM REV CODE 250: Performed by: STUDENT IN AN ORGANIZED HEALTH CARE EDUCATION/TRAINING PROGRAM

## 2020-12-21 PROCEDURE — D9220A PRA ANESTHESIA: ICD-10-PCS | Mod: CRNA,,, | Performed by: NURSE ANESTHETIST, CERTIFIED REGISTERED

## 2020-12-21 PROCEDURE — 77263 THER RADIOLOGY TX PLNG CPLX: CPT | Mod: ,,, | Performed by: RADIOLOGY

## 2020-12-21 PROCEDURE — 77014 CT GUIDANCE RADIATION THERAPY FIELD: CPT | Mod: TC

## 2020-12-21 PROCEDURE — 77778 APPLY INTERSTIT RADIAT COMPL: CPT | Mod: 26,,, | Performed by: RADIOLOGY

## 2020-12-21 PROCEDURE — 27200651 HC AIRWAY, LMA: Performed by: ANESTHESIOLOGY

## 2020-12-21 PROCEDURE — 37000009 HC ANESTHESIA EA ADD 15 MINS: Performed by: UROLOGY

## 2020-12-21 PROCEDURE — 71000015 HC POSTOP RECOV 1ST HR: Performed by: UROLOGY

## 2020-12-21 PROCEDURE — 25000003 PHARM REV CODE 250: Performed by: UROLOGY

## 2020-12-21 PROCEDURE — C1715 BRACHYTHERAPY NEEDLE: HCPCS | Performed by: UROLOGY

## 2020-12-21 PROCEDURE — D9220A PRA ANESTHESIA: Mod: CRNA,,, | Performed by: NURSE ANESTHETIST, CERTIFIED REGISTERED

## 2020-12-21 PROCEDURE — 77370 RADIATION PHYSICS CONSULT: CPT | Performed by: RADIOLOGY

## 2020-12-21 PROCEDURE — U0002 COVID-19 LAB TEST NON-CDC: HCPCS

## 2020-12-21 PROCEDURE — 77778 APPLY INTERSTIT RADIAT COMPL: CPT | Mod: TC | Performed by: RADIOLOGY

## 2020-12-21 PROCEDURE — 55875 TRANSPERI NEEDLE PLACE PROS: CPT | Mod: ,,, | Performed by: UROLOGY

## 2020-12-21 DEVICE — NDL W/PD103 SEEDS STERILE: Type: IMPLANTABLE DEVICE | Site: PROSTATE | Status: FUNCTIONAL

## 2020-12-21 RX ORDER — LIDOCAINE HYDROCHLORIDE 20 MG/ML
INJECTION, SOLUTION EPIDURAL; INFILTRATION; INTRACAUDAL; PERINEURAL
Status: DISCONTINUED | OUTPATIENT
Start: 2020-12-21 | End: 2020-12-21

## 2020-12-21 RX ORDER — HYDROCODONE BITARTRATE AND ACETAMINOPHEN 5; 325 MG/1; MG/1
1 TABLET ORAL EVERY 4 HOURS PRN
Status: DISCONTINUED | OUTPATIENT
Start: 2020-12-21 | End: 2020-12-21 | Stop reason: HOSPADM

## 2020-12-21 RX ORDER — LIDOCAINE HYDROCHLORIDE 20 MG/ML
JELLY TOPICAL
Status: DISCONTINUED | OUTPATIENT
Start: 2020-12-21 | End: 2020-12-21 | Stop reason: HOSPADM

## 2020-12-21 RX ORDER — SODIUM CHLORIDE 9 MG/ML
INJECTION, SOLUTION INTRAVENOUS CONTINUOUS
Status: DISCONTINUED | OUTPATIENT
Start: 2020-12-21 | End: 2020-12-21 | Stop reason: HOSPADM

## 2020-12-21 RX ORDER — HALOPERIDOL 5 MG/ML
0.5 INJECTION INTRAMUSCULAR EVERY 10 MIN PRN
Status: DISCONTINUED | OUTPATIENT
Start: 2020-12-21 | End: 2020-12-21 | Stop reason: HOSPADM

## 2020-12-21 RX ORDER — ONDANSETRON 2 MG/ML
INJECTION INTRAMUSCULAR; INTRAVENOUS
Status: DISCONTINUED | OUTPATIENT
Start: 2020-12-21 | End: 2020-12-21

## 2020-12-21 RX ORDER — IBUPROFEN 800 MG/1
800 TABLET ORAL 3 TIMES DAILY
Qty: 30 TABLET | Refills: 0 | Status: SHIPPED | OUTPATIENT
Start: 2020-12-21 | End: 2022-05-11

## 2020-12-21 RX ORDER — FENTANYL CITRATE 50 UG/ML
INJECTION, SOLUTION INTRAMUSCULAR; INTRAVENOUS
Status: DISCONTINUED | OUTPATIENT
Start: 2020-12-21 | End: 2020-12-21

## 2020-12-21 RX ORDER — PROCHLORPERAZINE EDISYLATE 5 MG/ML
5 INJECTION INTRAMUSCULAR; INTRAVENOUS EVERY 30 MIN PRN
Status: DISCONTINUED | OUTPATIENT
Start: 2020-12-21 | End: 2020-12-21 | Stop reason: HOSPADM

## 2020-12-21 RX ORDER — CEFTRIAXONE 1 G/1
1 INJECTION, POWDER, FOR SOLUTION INTRAMUSCULAR; INTRAVENOUS
Status: COMPLETED | OUTPATIENT
Start: 2020-12-21 | End: 2020-12-21

## 2020-12-21 RX ORDER — DOXYCYCLINE HYCLATE 100 MG
100 TABLET ORAL 2 TIMES DAILY
Qty: 10 TABLET | Refills: 0 | Status: SHIPPED | OUTPATIENT
Start: 2020-12-21 | End: 2020-12-26

## 2020-12-21 RX ORDER — PROPOFOL 10 MG/ML
VIAL (ML) INTRAVENOUS
Status: DISCONTINUED | OUTPATIENT
Start: 2020-12-21 | End: 2020-12-21

## 2020-12-21 RX ORDER — OXYCODONE HYDROCHLORIDE 5 MG/1
5 TABLET ORAL
Status: DISCONTINUED | OUTPATIENT
Start: 2020-12-21 | End: 2020-12-21 | Stop reason: HOSPADM

## 2020-12-21 RX ORDER — HYDROMORPHONE HYDROCHLORIDE 1 MG/ML
0.2 INJECTION, SOLUTION INTRAMUSCULAR; INTRAVENOUS; SUBCUTANEOUS EVERY 5 MIN PRN
Status: DISCONTINUED | OUTPATIENT
Start: 2020-12-21 | End: 2020-12-21 | Stop reason: HOSPADM

## 2020-12-21 RX ORDER — TAMSULOSIN HYDROCHLORIDE 0.4 MG/1
0.4 CAPSULE ORAL DAILY
Qty: 30 CAPSULE | Refills: 11 | Status: SHIPPED | OUTPATIENT
Start: 2020-12-21 | End: 2021-10-11 | Stop reason: SDUPTHER

## 2020-12-21 RX ORDER — MIDAZOLAM HYDROCHLORIDE 1 MG/ML
INJECTION, SOLUTION INTRAMUSCULAR; INTRAVENOUS
Status: DISCONTINUED | OUTPATIENT
Start: 2020-12-21 | End: 2020-12-21

## 2020-12-21 RX ORDER — METHYLPREDNISOLONE 4 MG/1
TABLET ORAL
Qty: 21 TABLET | Refills: 0 | Status: SHIPPED | OUTPATIENT
Start: 2020-12-21 | End: 2022-05-11

## 2020-12-21 RX ORDER — SODIUM CHLORIDE 0.9 % (FLUSH) 0.9 %
3 SYRINGE (ML) INJECTION
Status: DISCONTINUED | OUTPATIENT
Start: 2020-12-21 | End: 2020-12-21 | Stop reason: HOSPADM

## 2020-12-21 RX ORDER — HYDROCODONE BITARTRATE AND ACETAMINOPHEN 5; 325 MG/1; MG/1
1 TABLET ORAL EVERY 6 HOURS PRN
Qty: 6 TABLET | Refills: 0 | Status: SHIPPED | OUTPATIENT
Start: 2020-12-21 | End: 2022-05-11

## 2020-12-21 RX ADMIN — FENTANYL CITRATE 25 MCG: 50 INJECTION, SOLUTION INTRAMUSCULAR; INTRAVENOUS at 02:12

## 2020-12-21 RX ADMIN — MIDAZOLAM 1 MG: 1 INJECTION INTRAMUSCULAR; INTRAVENOUS at 02:12

## 2020-12-21 RX ADMIN — PROPOFOL 150 MG: 10 INJECTION, EMULSION INTRAVENOUS at 02:12

## 2020-12-21 RX ADMIN — SODIUM CHLORIDE 10 ML/HR: 0.9 INJECTION, SOLUTION INTRAVENOUS at 12:12

## 2020-12-21 RX ADMIN — ONDANSETRON 4 MG: 2 INJECTION INTRAMUSCULAR; INTRAVENOUS at 03:12

## 2020-12-21 RX ADMIN — PROPOFOL 50 MG: 10 INJECTION, EMULSION INTRAVENOUS at 02:12

## 2020-12-21 RX ADMIN — FENTANYL CITRATE 25 MCG: 50 INJECTION, SOLUTION INTRAMUSCULAR; INTRAVENOUS at 03:12

## 2020-12-21 RX ADMIN — LIDOCAINE HYDROCHLORIDE 50 MG: 20 INJECTION, SOLUTION EPIDURAL; INFILTRATION; INTRACAUDAL at 02:12

## 2020-12-21 RX ADMIN — CEFTRIAXONE SODIUM 1 G: 1 INJECTION, POWDER, FOR SOLUTION INTRAMUSCULAR; INTRAVENOUS at 02:12

## 2020-12-21 RX ADMIN — PROPOFOL 30 MG: 10 INJECTION, EMULSION INTRAVENOUS at 02:12

## 2020-12-21 NOTE — PLAN OF CARE
Patient and spouse state they are ready to be discharged. Instructions and prescription (delivered to bedside) given to patient and family. Both verbalize understanding. Patient tolerating po liquids and able to urinate with no difficulty. Patient states pain is at a tolerable level for them. Anesthesia consent and surgical consent in chart upon patient's discharge from [facility].

## 2020-12-21 NOTE — OP NOTE
[facility]  Operative Note    Date: 12/21/2020    Pre-Op Diagnosis: Prostate Cancer    Patient Active Problem List    Diagnosis Date Noted    Prostate cancer 12/21/2020    Cancer of prostate 12/02/2020    Gastroesophageal reflux disease 03/14/2019    Shortness of breath 03/14/2019    RBBB 03/14/2019    Acute prostatitis 01/20/2018    Cervical radiculopathy 10/24/2014    Cervical stenosis of spinal canal 03/14/2014    Cervical spondylosis with myelopathy 03/14/2014    Cervical spine disease 01/16/2014    Neck pain 12/26/2013    Headache 11/08/2012    Hypertension          Post-Op Diagnosis: same    Procedure(s) Performed:   1. Transrectal ultrasound of prostate  2. Ultrasound guided needle placement  3. Radioactive seed implantation in prostate  4. Fluoroscopy <1 hour    Specimen(s): none    Staff Surgeon: Luís Naranjo MD    Assistant Surgeon: Catracho Alvarenga MD    Anesthesia: General LMA anesthesia    Indications: Erasmo Fitch is a 75 y.o. male with Tram 6 (3+3) prostate cancer presenting for brachytherapy seed implantation. Prostate volume on MRI ~20 cc; patient is s/p TURP.    Findings:   - 71 seeds placed; good placement on postop X-ray    Estimated Blood Loss: min    Drains: none    Procedure in detail:  The patient confirmed he had taken his pre-procedure antibiotics and did the bowel prep.  After risks benefits and possible complications of placement of radioactive seed placement were discussed, the patient elected to undergo the procedure and informed consent was obtained. All questions were answered in the pre-operative area. The patient was transferred to cystoscopy suite and placed in the supine position.  SCDs were applied and working.  Anesthesia was administered.  Once adequately sedated, the patient was placed in dorsal lithotomy position and prepped and draped in the usual sterile fashion. Time out was performed, neville-procedural antibiotics were confirmed    A  16-Frisian Solitario catheter was advanced into the bladder and 10 mL of sterile water was used to inflate the balloon.  The patient's bladder was emptied completely and filled with 120 mL of  sterile water.    The US probe was introduced into the patient's rectum and the secured with stabilizers in coordination with the patients previous prostate mapping.      A total of 71 preloaded seeds were placed trans perineally into the prostate in accordance to the patients previous prostate mapping under the guidance of radiation oncology, who was present throughout the procedure.      A KUB was taken at the end of the procedure to confirm seed implantation.       Follow up care:  The patient will follow up with Dr. Naranjo as scheduled.  He will get a CT scan of the abdomen and pelvis before leaving today. He will be discharged home with prescriptions for antibiotics, medrol dose pack, ibuprofen 800 TID, and flomax      Catracho Alvarenga MD

## 2020-12-21 NOTE — TRANSFER OF CARE
Anesthesia Transfer of Care Note    Patient: Erasmo Fitch    Procedure(s) Performed: Procedure(s) (LRB):  INSERTION, RADIOACTIVE SEED (N/A)    Patient location: [facility]    Anesthesia Type: general    Transport from OR: Transported from OR on 6-10 L/min O2 by face mask with adequate spontaneous ventilation    Post pain: adequate analgesia    Post assessment: no apparent anesthetic complications    Post vital signs: stable    Level of consciousness: sedated    Nausea/Vomiting: no nausea/vomiting    Complications: none    Transfer of care protocol was followed      Last vitals:   Visit Vitals  BP (!) 144/86 (BP Location: Left arm, Patient Position: Lying)   Pulse (!) 58   Temp 36.4 °C (97.5 °F) (Temporal)   Resp 20   Wt 80.3 kg (177 lb)   SpO2 99%   BMI 25.40 kg/m²

## 2020-12-21 NOTE — ANESTHESIA PROCEDURE NOTES
Intubation  Performed by: Davina Colon CRNA  Authorized by: Prasanth Tejada MD     Intubation:     Induction:  Intravenous    Intubated:  Postinduction    Mask Ventilation:  N/a    Attempts:  1    Attempted By:  CRNA    Difficult Airway Encountered?: No      Complications:  None    Airway Device:  Supraglottic airway/LMA    Airway Device Size:  5.0    Style/Cuff Inflation:  Cuffed (inflated to minimal occlusive pressure)    Placement Verified By:  Capnometry    Complicating Factors:  None    Findings Post-Intubation:  BS equal bilateral

## 2020-12-21 NOTE — DISCHARGE INSTRUCTIONS
Home Care Instructions  PROSTATE SEED IMPLANT    ACTIVITY LEVEL:  The radioactive Palladium 103/ Iodine 125 seeds are surgically implanted directly into the prostate gland. A very small amount of  radioactivity is present outside the body. The precautions that we ask are to insure that those around you are protected from  unnecessary radiation exposure. Children should not be allowed to sit on your lap for 5 weeks following the implant. You may  affectionately greet (hug/kiss) a child for a short period of time. Pregnant woman should avoid prolonged close physical contact with  you for 8 weeks after the implant. Pregnant women, or possible pregnant women, can safely be in close contact with you for a limited  period of time. At a distance of 6 feet there is no limit to the length of time you may spend together.  SEXUAL ACTIVITY:  You may sleep in the same bed as your partner (provided she is not pregnant or under the age of 45). Sexual intercourse, using a  condom, may be resumed 4 weeks after the implant. Your semen may be discolored dark brown or black. This is normal and is a  result of bleeding that may have occurred during the implant. After 8 weeks it will not be necessary to use a condom.  DAILY ACTIVITIES:  You may resume your normal activities in a few days (example: work, shopping, [employer]) without the risk of harmful radiation  exposure to those around. Objects that you touch or items that you use do not become radioactive. Linens, clothing, tableware and  dishes may be used by other persons without special precautions. Your bodily wastes (urine and stool) are not radioactive. Minor  burning during urination, urinating more frequently, mild pain or feeling unable to pass your urine freely are common and usually stop  in 1 to 4 months.  BATHING:  You may shower or bathe as desired, the following day.  DIET:  At home, continue with liquids, and if there is no nausea, you may eat a soft diet. Gradually resume  your normal diet.  MEDICATIONS:  You will receive instructions for any pain and/or antibiotic prescriptions. Pain medication should be taken only if needed and as  directed. Antibiotics, if ordered, should be taken as directed until the entire prescription is completed.  SPECIAL PRECAUTIONS:  For 2 weeks after the implant procedure it is possible to lose the Palladium 103/ Iodine 125 seeds through urination. It is possible to  pass seeds indefinitely, but it is unlikely after the first 2 weeks. Please strain your urine for 3-5 days and look for any Palladium 103/  Iodine 125 seeds. The seeds are silver in color and the size of a grain of rice. Whenever a seed is found, it should be picked up with a  spoon and wrapped in aluminum foil. The seed should not be handled with your fingers. The foil should then be in an inaccessible  area of your home until it can be returned to [facility]. Any seeds passed should be returned to the Radiation Oncology  Department as [facility], to the attention of the Radiation Safety Office, [facility], [address] [place] [address]. The Radiation Oncology Department is open Monday-Friday, 7:00 a.m. to 4:00 p.m. If the patient needs to be  hospitalized, or if death should occur within 16 weeks of the implant, relatives should notify the above department at [phone].  WHEN TO CALL THE DOCTOR:   Excessive burning during urination, or the inability to urinate.   Fever over 101ºF (38.4ºC).    FOR EMERGENCIES:  If any unusual problems or difficulties occur, contact , or the resident at [phone] (page  ) or at the Clinic Office.

## 2020-12-21 NOTE — ANESTHESIA PREPROCEDURE EVALUATION
12/21/2020  Erasmo Fitch is a 75 y.o., male.      Anesthesia Evaluation    I have reviewed the Patient Summary Reports.    I have reviewed the Nursing Notes. I have reviewed the NPO Status.   I have reviewed the Medications.     Review of Systems  Anesthesia Hx:  No problems with previous Anesthesia  History of prior surgery of interest to airway management or planning: Previous anesthesia: MAC  9/18/2020 Prostate Biopsy with MAC.  Denies Family Hx of Anesthesia complications.   Denies Personal Hx of Anesthesia complications.   Social:  Social Alcohol Use, Non-Smoker    Hematology/Oncology:  Hematology Normal      Oncology Comments: Prostate CA    EENT/Dental:   Ears General/Symptom(s) Tinnitus   Cardiovascular:   Hypertension Denies MI.   Denies CABG/stent. Dysrhythmias   Denies Angina. ECG has been reviewed.  Functional Capacity good / => 4 METS  Hypertension, Essential Hypertension , Well Controlled on Rx  Disorder of Cardiac Rhythm (Right BBB)    Pulmonary:   Denies Asthma.  Denies Shortness of breath.  Denies Recent URI.    Renal/:  Renal/ Normal     Hepatic/GI:   GERD, well controlled    Musculoskeletal:  Cervical Spine Disorder, Cervical Disc Disease, Radiculopathy, S/P Cervical Fusion, Cervical Spine Limited Mobility    Neurological:   Denies CVA. Neuromuscular Disease,  Headaches Denies Seizures.    Endocrine:  Endocrine Normal    Psych:  Psychiatric Normal           Physical Exam  General:  Well nourished    Airway/Jaw/Neck:  Airway Findings: Mouth Opening: Normal Tongue: Normal  General Airway Assessment: Adult  Mallampati: I  Improves to I with phonation.  TM Distance: Normal, at least 6 cm        Eyes/Ears/Nose:  EYES/EARS/NOSE FINDINGS: Normal   Dental:  DENTAL FINDINGS: Normal   Chest/Lungs:  Chest/Lungs Findings: Clear to auscultation, Normal Respiratory Rate     Heart/Vascular:  Heart  Findings: Rate: Normal  Rhythm: Regular Rhythm  Sounds: Normal     Abdomen:  Abdomen Findings: Normal    Musculoskeletal:  Musculoskeletal Findings: Normal   Skin:  Skin Findings: Normal    Mental Status:  Mental Status Findings:  Cooperative, Alert and Oriented         Anesthesia Assessment: Preoperative EQUATION    Planned Procedure: Procedure(s) (LRB):  INSERTION, RADIOACTIVE SEED (N/A)  Requested Anesthesia Type:Monitor Anesthesia Care  Surgeon: Abhinav Cunha MD  Service: Urology  Known or anticipated Date of Surgery:12/21/2020    Surgeon notes: No note to review    Electronic QUestionnaire Assessment completed via nurse interview with patient.        Triage considerations:     The patient has no apparent active cardiac condition (No unstable coronary Syndrome such as severe unstable angina or recent [<1 month] myocardial infarction, decompensated CHF, severe valvular   disease or significant arrhythmia)    Previous anesthesia records:MAC and No problems   9/18/2020 Prostate Biopsy ([facility])  MAC/ASA2    Last PCP note: outside [facility]   Subspecialty notes: Neurosurgery, Radiology/ONC, Urology    Other important co-morbidities: GERD, HTN and Right BBB, Cervical Spinal Fusion, Prostate CA      Tests already available:  No recent tests.             Instructions given. (See in Nurse's note)    Optimization:  Anesthesia Preop Clinic Assessment  Indicated: Not required for this procedure.      Plan:    Testing:  BMP and EKG     Patient  has previously scheduled Medical Appointment: Not at this time prior to surgery    Navigation: Tests Scheduled.              Results will be tracked by Preop Clinic.    12/16/2020 Lab results noted.  EKG marked for MD review.  12/17/2020 Lab/EKG results reviewed per Dr. Pineda.      Anesthesia Plan  Type of Anesthesia, risks & benefits discussed:  Anesthesia Type:  general, MAC  Patient's Preference:   Intra-op Monitoring Plan: standard ASA monitors  Intra-op Monitoring Plan  Comments:   Post Op Pain Control Plan:   Post Op Pain Control Plan Comments:   Induction:   IV  Beta Blocker:  Patient is not currently on a Beta-Blocker (No further documentation required).       Informed Consent: Patient understands risks and agrees with Anesthesia plan.  Questions answered. Anesthesia consent signed with patient.  ASA Score: 3     Day of Surgery Review of History & Physical:    H&P update referred to the surgeon.         Ready For Surgery From Anesthesia Perspective.       Lab Results   Component Value Date    WBC 7.26 03/25/2019    HGB 14.7 03/25/2019    HGB 14.7 03/25/2019    HCT 44.3 03/25/2019    MCV 92 03/25/2019     03/25/2019       BMP  Lab Results   Component Value Date     12/15/2020    K 3.5 12/15/2020     12/15/2020    CO2 31 (H) 12/15/2020    BUN 20 12/15/2020    CREATININE 1.0 12/15/2020    CALCIUM 9.2 12/15/2020    ANIONGAP 10 12/15/2020    ESTGFRAFRICA >60.0 12/15/2020    EGFRNONAA >60.0 12/15/2020

## 2020-12-21 NOTE — INTERVAL H&P NOTE
The patient has been examined and the H&P has been reviewed:    I concur with the findings and no changes have occurred since H&P was written.  Urine dipstick today negative for nitrites, blood, LE.    Anesthesia/Surgery risks, benefits and alternative options discussed and understood by patient/family.          Active Hospital Problems    Diagnosis  POA    Prostate cancer [C61]  Yes      Resolved Hospital Problems   No resolved problems to display.

## 2020-12-21 NOTE — OP NOTE
[facility]  Operative Note    Date: 12/21/2020    Pre-Op Diagnosis: Prostate Cancer    Patient Active Problem List    Diagnosis Date Noted    Prostate cancer 12/21/2020    Cancer of prostate 12/02/2020    Gastroesophageal reflux disease 03/14/2019    Shortness of breath 03/14/2019    RBBB 03/14/2019    Acute prostatitis 01/20/2018    Cervical radiculopathy 10/24/2014    Cervical stenosis of spinal canal 03/14/2014    Cervical spondylosis with myelopathy 03/14/2014    Cervical spine disease 01/16/2014    Neck pain 12/26/2013    Headache 11/08/2012    Hypertension          Post-Op Diagnosis: same    Procedure(s) Performed:   1. Transrectal ultrasound of prostate  2. Ultrasound guided needle placement  3. Radioactive seed implantation in prostate  4. Fluoroscopy <1 hour    Specimen(s): none    Staff Surgeon: Abhinav Cunha MD    Assistant Surgeon: Catracho Alvarenga MD    Anesthesia: General LMA anesthesia    Indications: Erasmo Fitch is a 75 y.o. male with Tram 6 (3+3) prostate cancer presenting for brachytherapy seed implantation. Prostate volume on MRI ~20 cc; patient is s/p TURP.    Findings:   - 71 seeds placed; good placement on postop X-ray    Estimated Blood Loss: min    Drains: none    Procedure in detail:  The patient confirmed he had taken his pre-procedure antibiotics and did the bowel prep.  After risks benefits and possible complications of placement of radioactive seed placement were discussed, the patient elected to undergo the procedure and informed consent was obtained. All questions were answered in the pre-operative area. The patient was transferred to cystoscopy suite and placed in the supine position.  SCDs were applied and working.  Anesthesia was administered.  Once adequately sedated, the patient was placed in dorsal lithotomy position and prepped and draped in the usual sterile fashion. Time out was performed, neville-procedural antibiotics were confirmed    A  16-Yoruba Solitario catheter was advanced into the bladder and 10 mL of sterile water was used to inflate the balloon.  The patient's bladder was emptied completely and filled with 120 mL of  sterile water.    The US probe was introduced into the patient's rectum and the secured with stabilizers in coordination with the patients previous prostate mapping.      A total of 71 preloaded seeds were placed trans perineally into the prostate in accordance to the patients previous prostate mapping under the guidance of radiation oncology, who was present throughout the procedure.      A KUB was taken at the end of the procedure to confirm seed implantation.       Follow up care:  The patient will follow up with Dr. Naranjo as scheduled.  He will get a CT scan of the abdomen and pelvis before leaving today. He will be discharged home with prescriptions for antibiotics, medrol dose pack, ibuprofen 800 TID, and flomax      Catracho Alvarenga MD

## 2020-12-21 NOTE — DISCHARGE SUMMARY
[facility]  Discharge Note  Short Stay    Admit Date: 12/21/2020    Discharge Date and Time: 12/21/2020 3:13 PM      Attending Physician: Abhinav Cunha MD     Discharge Provider: Catracho Alvarenga    Diagnoses:  Active Hospital Problems    Diagnosis  POA    *Prostate cancer [C61]  Yes      Resolved Hospital Problems   No resolved problems to display.       Discharged Condition: good    Hospital Course: Patient was admitted for TRUS with placement of prostate brachytherapy seeds and tolerated the procedure well with no complications. The patient was discharged home in good condition on the same day.       Final Diagnoses: Same as principal problem.    Disposition: Home or Self Care    Follow up/Patient Instructions:    Medications:  Reconciled Home Medications: Current Discharge Medication List      START taking these medications    Details   doxycycline (VIBRA-TABS) 100 MG tablet Take 1 tablet (100 mg total) by mouth 2 (two) times daily. for 5 days  Qty: 10 tablet, Refills: 0      HYDROcodone-acetaminophen (NORCO) 5-325 mg per tablet Take 1 tablet by mouth every 6 (six) hours as needed for Pain.  Qty: 6 tablet, Refills: 0    Comments: Quantity prescribed more than 7 day supply? No      !! ibuprofen (ADVIL,MOTRIN) 800 MG tablet Take 1 tablet (800 mg total) by mouth 3 (three) times daily.  Qty: 30 tablet, Refills: 0      methylPREDNISolone (MEDROL DOSEPACK) 4 mg tablet use as directed  Qty: 1 Package, Refills: 0      tamsulosin (FLOMAX) 0.4 mg Cap Take 1 capsule (0.4 mg total) by mouth once daily.  Qty: 30 capsule, Refills: 11       !! - Potential duplicate medications found. Please discuss with provider.      CONTINUE these medications which have NOT CHANGED    Details   ALPRAZolam (XANAX) 1 MG tablet TAKE 1 TABLET BY MOUTH EVERY DAY AT BEDTIME AS NEEDED FOR ANXIETY      aspirin (ECOTRIN) 81 MG EC tablet Take 81 mg by mouth once daily.      celecoxib (CELEBREX) 400 MG capsule TAKE 1 CAPSULE (400 MG  TOTAL) BY MOUTH 2 (TWO) TIMES DAILY.  Qty: 60 capsule, Refills: 4      cyclobenzaprine HCl (FLEXERIL ORAL) Take 5 mg by mouth.      folic acid/multivit-min/lutein (CENTRUM SILVER ORAL) Take by mouth.      !! ibuprofen (ADVIL,MOTRIN) 800 MG tablet Take 800 mg by mouth every 6 (six) hours as needed for Pain.      loperamide (IMODIUM) 2 mg capsule Take 2 mg by mouth 4 (four) times daily as needed for Diarrhea.      mag/aluminum/sod bicarb/alginc (GAVISCON ORAL) Take by mouth.      pantoprazole (PROTONIX) 40 MG tablet 2 (two) times daily.       triamterene-hydrochlorothiazide 37.5-25 mg (DYAZIDE) 37.5-25 mg per capsule Take 1 capsule by mouth every morning.    Comments: .      vitamin D (VITAMIN D3) 1000 units Tab Take 2,000 Units by mouth once daily.       !! - Potential duplicate medications found. Please discuss with provider.        Discharge Procedure Orders   CT Guidance Radiation Therapy Field   Standing Status: Future Standing Exp. Date: 12/21/21     Order Specific Question Answer Comments   May the Radiologist modify the order per protocol to meet the clinical needs of the patient? Yes      Basic Metabolic Panel   Standing Status: Future Number of Occurrences: 1 Standing Exp. Date: 02/12/22     Diet general     Call MD for:  extreme fatigue     Call MD for:  hives     Call MD for:  persistent dizziness or light-headedness     Call MD for:  redness, tenderness, or signs of infection (pain, swelling, redness, odor or green/yellow discharge around incision site)     Call MD for:  difficulty breathing, headache or visual disturbances     Call MD for:  persistent nausea and vomiting     Call MD for:  severe uncontrolled pain     Call MD for:  temperature >100.4     EKG 12-lead   Standing Status: Future Number of Occurrences: 1 Standing Exp. Date: 12/14/21     Order Specific Question Answer Comments   Diagnosis Hypertension [611180]

## 2020-12-22 PROCEDURE — 77318 BRACHYTX ISODOSE COMPLEX: CPT | Mod: 26,,, | Performed by: RADIOLOGY

## 2020-12-22 PROCEDURE — 77318 BRACHYTX ISODOSE COMPLEX: CPT | Mod: TC | Performed by: RADIOLOGY

## 2020-12-22 PROCEDURE — 77318 PR BRACYTHERAPY ISODOSE PLAN; COMPLEX: ICD-10-PCS | Mod: 26,,, | Performed by: RADIOLOGY

## 2020-12-22 NOTE — ANESTHESIA POSTPROCEDURE EVALUATION
Anesthesia Post Evaluation    Patient: Erasmo Fitch    Procedure(s) Performed: Procedure(s) (LRB):  INSERTION, RADIOACTIVE SEED (N/A)    Final Anesthesia Type: general      Patient location during evaluation: PACU  Patient participation: Yes- Able to Participate  Level of consciousness: awake and alert and oriented  Post-procedure vital signs: reviewed and stable  Pain management: adequate  Airway patency: patent    PONV status at discharge: No PONV  Anesthetic complications: no      Cardiovascular status: blood pressure returned to baseline  Respiratory status: unassisted  Hydration status: euvolemic  Follow-up not needed.          Vitals Value Taken Time   /97 12/21/20 1718   Temp  12/21/20 1809   Pulse 59 12/21/20 1745   Resp  12/21/20 1809   SpO2 92 % 12/21/20 1745   Vitals shown include unvalidated device data.      No case tracking events are documented in the log.      Pain/Anusha Score: Anusha Score: 9 (12/21/2020  3:21 PM)

## 2020-12-23 ENCOUNTER — TELEPHONE (OUTPATIENT)
Dept: RADIATION ONCOLOGY | Facility: CLINIC | Age: 75
End: 2020-12-23

## 2020-12-23 NOTE — TELEPHONE ENCOUNTER
"Phone review s/p brachytherapy; pt states he is doing "great!" Afebrile, denies pain. No dysuria, slight hematuria, nocturia x 2. Last bowel movement 12/23/20 at 2:15 (normal). Currently taking medications received at discharge. Confirmed follow up appointment. Pt agrees to contact us if he should have any questions or concerns.       "

## 2021-01-05 ENCOUNTER — OFFICE VISIT (OUTPATIENT)
Dept: RADIATION ONCOLOGY | Facility: CLINIC | Age: 76
End: 2021-01-05
Payer: MEDICARE

## 2021-01-05 DIAGNOSIS — C61 CANCER OF PROSTATE: Primary | ICD-10-CM

## 2021-01-05 PROCEDURE — 99999 PR PBB SHADOW E&M-EST. PATIENT-LVL I: CPT | Mod: PBBFAC,,, | Performed by: RADIOLOGY

## 2021-01-05 PROCEDURE — 99499 NO LOS: ICD-10-PCS | Mod: S$GLB,,, | Performed by: RADIOLOGY

## 2021-01-05 PROCEDURE — 99999 PR PBB SHADOW E&M-EST. PATIENT-LVL I: ICD-10-PCS | Mod: PBBFAC,,, | Performed by: RADIOLOGY

## 2021-01-05 PROCEDURE — 99499 UNLISTED E&M SERVICE: CPT | Mod: S$GLB,,, | Performed by: RADIOLOGY

## 2021-03-21 ENCOUNTER — OFFICE VISIT (OUTPATIENT)
Dept: URGENT CARE | Facility: CLINIC | Age: 76
End: 2021-03-21
Payer: MEDICARE

## 2021-03-21 VITALS
SYSTOLIC BLOOD PRESSURE: 143 MMHG | BODY MASS INDEX: 25.34 KG/M2 | HEART RATE: 85 BPM | WEIGHT: 177 LBS | RESPIRATION RATE: 14 BRPM | HEIGHT: 70 IN | TEMPERATURE: 98 F | DIASTOLIC BLOOD PRESSURE: 87 MMHG | OXYGEN SATURATION: 96 %

## 2021-03-21 DIAGNOSIS — Z77.098 CHEMICAL EXPOSURE OF EYE: Primary | ICD-10-CM

## 2021-03-21 PROCEDURE — 99214 OFFICE O/P EST MOD 30 MIN: CPT | Mod: S$GLB,,, | Performed by: NURSE PRACTITIONER

## 2021-03-21 PROCEDURE — 99214 PR OFFICE/OUTPT VISIT, EST, LEVL IV, 30-39 MIN: ICD-10-PCS | Mod: S$GLB,,, | Performed by: NURSE PRACTITIONER

## 2021-03-21 RX ORDER — ERYTHROMYCIN 5 MG/G
OINTMENT OPHTHALMIC 3 TIMES DAILY
Qty: 1 G | Refills: 0 | Status: SHIPPED | OUTPATIENT
Start: 2021-03-21 | End: 2022-05-11

## 2021-03-23 ENCOUNTER — OFFICE VISIT (OUTPATIENT)
Dept: RADIATION ONCOLOGY | Facility: CLINIC | Age: 76
End: 2021-03-23
Payer: MEDICARE

## 2021-03-23 VITALS
DIASTOLIC BLOOD PRESSURE: 89 MMHG | SYSTOLIC BLOOD PRESSURE: 153 MMHG | BODY MASS INDEX: 25.24 KG/M2 | WEIGHT: 176.31 LBS | RESPIRATION RATE: 19 BRPM | OXYGEN SATURATION: 96 % | TEMPERATURE: 96 F | HEIGHT: 70 IN | HEART RATE: 75 BPM

## 2021-03-23 DIAGNOSIS — C61 CANCER OF PROSTATE: Primary | ICD-10-CM

## 2021-03-23 PROCEDURE — 3077F PR MOST RECENT SYSTOLIC BLOOD PRESSURE >= 140 MM HG: ICD-10-PCS | Mod: CPTII,S$GLB,, | Performed by: RADIOLOGY

## 2021-03-23 PROCEDURE — 3077F SYST BP >= 140 MM HG: CPT | Mod: CPTII,S$GLB,, | Performed by: RADIOLOGY

## 2021-03-23 PROCEDURE — 99999 PR PBB SHADOW E&M-EST. PATIENT-LVL IV: CPT | Mod: PBBFAC,,, | Performed by: RADIOLOGY

## 2021-03-23 PROCEDURE — 1126F PR PAIN SEVERITY QUANTIFIED, NO PAIN PRESENT: ICD-10-PCS | Mod: S$GLB,,, | Performed by: RADIOLOGY

## 2021-03-23 PROCEDURE — 3079F PR MOST RECENT DIASTOLIC BLOOD PRESSURE 80-89 MM HG: ICD-10-PCS | Mod: CPTII,S$GLB,, | Performed by: RADIOLOGY

## 2021-03-23 PROCEDURE — 99212 OFFICE O/P EST SF 10 MIN: CPT | Mod: S$GLB,,, | Performed by: RADIOLOGY

## 2021-03-23 PROCEDURE — 1101F PT FALLS ASSESS-DOCD LE1/YR: CPT | Mod: CPTII,S$GLB,, | Performed by: RADIOLOGY

## 2021-03-23 PROCEDURE — 1159F PR MEDICATION LIST DOCUMENTED IN MEDICAL RECORD: ICD-10-PCS | Mod: S$GLB,,, | Performed by: RADIOLOGY

## 2021-03-23 PROCEDURE — 3079F DIAST BP 80-89 MM HG: CPT | Mod: CPTII,S$GLB,, | Performed by: RADIOLOGY

## 2021-03-23 PROCEDURE — 1101F PR PT FALLS ASSESS DOC 0-1 FALLS W/OUT INJ PAST YR: ICD-10-PCS | Mod: CPTII,S$GLB,, | Performed by: RADIOLOGY

## 2021-03-23 PROCEDURE — 1126F AMNT PAIN NOTED NONE PRSNT: CPT | Mod: S$GLB,,, | Performed by: RADIOLOGY

## 2021-03-23 PROCEDURE — 3288F PR FALLS RISK ASSESSMENT DOCUMENTED: ICD-10-PCS | Mod: CPTII,S$GLB,, | Performed by: RADIOLOGY

## 2021-03-23 PROCEDURE — 99212 PR OFFICE/OUTPT VISIT, EST, LEVL II, 10-19 MIN: ICD-10-PCS | Mod: S$GLB,,, | Performed by: RADIOLOGY

## 2021-03-23 PROCEDURE — 99999 PR PBB SHADOW E&M-EST. PATIENT-LVL IV: ICD-10-PCS | Mod: PBBFAC,,, | Performed by: RADIOLOGY

## 2021-03-23 PROCEDURE — 1159F MED LIST DOCD IN RCRD: CPT | Mod: S$GLB,,, | Performed by: RADIOLOGY

## 2021-03-23 PROCEDURE — 3288F FALL RISK ASSESSMENT DOCD: CPT | Mod: CPTII,S$GLB,, | Performed by: RADIOLOGY

## 2021-03-24 ENCOUNTER — TELEPHONE (OUTPATIENT)
Dept: URGENT CARE | Facility: CLINIC | Age: 76
End: 2021-03-24

## 2021-04-16 ENCOUNTER — PATIENT MESSAGE (OUTPATIENT)
Dept: RESEARCH | Facility: HOSPITAL | Age: 76
End: 2021-04-16

## 2021-07-01 ENCOUNTER — TELEPHONE (OUTPATIENT)
Dept: SURGERY | Facility: CLINIC | Age: 76
End: 2021-07-01

## 2021-07-02 ENCOUNTER — OFFICE VISIT (OUTPATIENT)
Dept: SURGERY | Facility: CLINIC | Age: 76
End: 2021-07-02
Payer: MEDICARE

## 2021-07-02 VITALS
SYSTOLIC BLOOD PRESSURE: 144 MMHG | DIASTOLIC BLOOD PRESSURE: 73 MMHG | WEIGHT: 174.81 LBS | BODY MASS INDEX: 25.03 KG/M2 | HEIGHT: 70 IN | HEART RATE: 60 BPM

## 2021-07-02 DIAGNOSIS — R15.0 INCOMPLETE DEFECATION: Primary | ICD-10-CM

## 2021-07-02 PROCEDURE — 99204 PR OFFICE/OUTPT VISIT, NEW, LEVL IV, 45-59 MIN: ICD-10-PCS | Mod: 25,S$GLB,, | Performed by: STUDENT IN AN ORGANIZED HEALTH CARE EDUCATION/TRAINING PROGRAM

## 2021-07-02 PROCEDURE — 46600 PR DIAG2STIC A2SCOPY: ICD-10-PCS | Mod: S$GLB,,, | Performed by: STUDENT IN AN ORGANIZED HEALTH CARE EDUCATION/TRAINING PROGRAM

## 2021-07-02 PROCEDURE — 1126F PR PAIN SEVERITY QUANTIFIED, NO PAIN PRESENT: ICD-10-PCS | Mod: S$GLB,,, | Performed by: STUDENT IN AN ORGANIZED HEALTH CARE EDUCATION/TRAINING PROGRAM

## 2021-07-02 PROCEDURE — 1101F PR PT FALLS ASSESS DOC 0-1 FALLS W/OUT INJ PAST YR: ICD-10-PCS | Mod: CPTII,S$GLB,, | Performed by: STUDENT IN AN ORGANIZED HEALTH CARE EDUCATION/TRAINING PROGRAM

## 2021-07-02 PROCEDURE — 3288F PR FALLS RISK ASSESSMENT DOCUMENTED: ICD-10-PCS | Mod: CPTII,S$GLB,, | Performed by: STUDENT IN AN ORGANIZED HEALTH CARE EDUCATION/TRAINING PROGRAM

## 2021-07-02 PROCEDURE — 1159F PR MEDICATION LIST DOCUMENTED IN MEDICAL RECORD: ICD-10-PCS | Mod: S$GLB,,, | Performed by: STUDENT IN AN ORGANIZED HEALTH CARE EDUCATION/TRAINING PROGRAM

## 2021-07-02 PROCEDURE — 99999 PR PBB SHADOW E&M-EST. PATIENT-LVL III: CPT | Mod: PBBFAC,,, | Performed by: STUDENT IN AN ORGANIZED HEALTH CARE EDUCATION/TRAINING PROGRAM

## 2021-07-02 PROCEDURE — 99204 OFFICE O/P NEW MOD 45 MIN: CPT | Mod: 25,S$GLB,, | Performed by: STUDENT IN AN ORGANIZED HEALTH CARE EDUCATION/TRAINING PROGRAM

## 2021-07-02 PROCEDURE — 99999 PR PBB SHADOW E&M-EST. PATIENT-LVL III: ICD-10-PCS | Mod: PBBFAC,,, | Performed by: STUDENT IN AN ORGANIZED HEALTH CARE EDUCATION/TRAINING PROGRAM

## 2021-07-02 PROCEDURE — 3288F FALL RISK ASSESSMENT DOCD: CPT | Mod: CPTII,S$GLB,, | Performed by: STUDENT IN AN ORGANIZED HEALTH CARE EDUCATION/TRAINING PROGRAM

## 2021-07-02 PROCEDURE — 1101F PT FALLS ASSESS-DOCD LE1/YR: CPT | Mod: CPTII,S$GLB,, | Performed by: STUDENT IN AN ORGANIZED HEALTH CARE EDUCATION/TRAINING PROGRAM

## 2021-07-02 PROCEDURE — 1159F MED LIST DOCD IN RCRD: CPT | Mod: S$GLB,,, | Performed by: STUDENT IN AN ORGANIZED HEALTH CARE EDUCATION/TRAINING PROGRAM

## 2021-07-02 PROCEDURE — 46600 DIAGNOSTIC ANOSCOPY SPX: CPT | Mod: S$GLB,,, | Performed by: STUDENT IN AN ORGANIZED HEALTH CARE EDUCATION/TRAINING PROGRAM

## 2021-07-02 PROCEDURE — 1126F AMNT PAIN NOTED NONE PRSNT: CPT | Mod: S$GLB,,, | Performed by: STUDENT IN AN ORGANIZED HEALTH CARE EDUCATION/TRAINING PROGRAM

## 2021-09-20 ENCOUNTER — LAB VISIT (OUTPATIENT)
Dept: LAB | Facility: HOSPITAL | Age: 76
End: 2021-09-20
Attending: RADIOLOGY
Payer: MEDICARE

## 2021-09-20 DIAGNOSIS — C61 CANCER OF PROSTATE: ICD-10-CM

## 2021-09-20 LAB — COMPLEXED PSA SERPL-MCNC: 0.61 NG/ML (ref 0–4)

## 2021-09-20 PROCEDURE — 84153 ASSAY OF PSA TOTAL: CPT | Performed by: RADIOLOGY

## 2021-09-20 PROCEDURE — 36415 COLL VENOUS BLD VENIPUNCTURE: CPT | Mod: PO | Performed by: RADIOLOGY

## 2021-10-11 ENCOUNTER — OFFICE VISIT (OUTPATIENT)
Dept: RADIATION ONCOLOGY | Facility: CLINIC | Age: 76
End: 2021-10-11
Attending: RADIOLOGY
Payer: MEDICARE

## 2021-10-11 VITALS
BODY MASS INDEX: 25.67 KG/M2 | SYSTOLIC BLOOD PRESSURE: 145 MMHG | DIASTOLIC BLOOD PRESSURE: 78 MMHG | RESPIRATION RATE: 16 BRPM | HEART RATE: 61 BPM | WEIGHT: 179.31 LBS | HEIGHT: 70 IN

## 2021-10-11 DIAGNOSIS — C61 CANCER OF PROSTATE: Primary | ICD-10-CM

## 2021-10-11 PROCEDURE — 99999 PR PBB SHADOW E&M-EST. PATIENT-LVL IV: ICD-10-PCS | Mod: PBBFAC,,, | Performed by: RADIOLOGY

## 2021-10-11 PROCEDURE — 1159F PR MEDICATION LIST DOCUMENTED IN MEDICAL RECORD: ICD-10-PCS | Mod: CPTII,S$GLB,, | Performed by: RADIOLOGY

## 2021-10-11 PROCEDURE — 3077F SYST BP >= 140 MM HG: CPT | Mod: CPTII,S$GLB,, | Performed by: RADIOLOGY

## 2021-10-11 PROCEDURE — 3078F PR MOST RECENT DIASTOLIC BLOOD PRESSURE < 80 MM HG: ICD-10-PCS | Mod: CPTII,S$GLB,, | Performed by: RADIOLOGY

## 2021-10-11 PROCEDURE — 1125F AMNT PAIN NOTED PAIN PRSNT: CPT | Mod: CPTII,S$GLB,, | Performed by: RADIOLOGY

## 2021-10-11 PROCEDURE — 1160F PR REVIEW ALL MEDS BY PRESCRIBER/CLIN PHARMACIST DOCUMENTED: ICD-10-PCS | Mod: CPTII,S$GLB,, | Performed by: RADIOLOGY

## 2021-10-11 PROCEDURE — 99999 PR PBB SHADOW E&M-EST. PATIENT-LVL IV: CPT | Mod: PBBFAC,,, | Performed by: RADIOLOGY

## 2021-10-11 PROCEDURE — 1125F PR PAIN SEVERITY QUANTIFIED, PAIN PRESENT: ICD-10-PCS | Mod: CPTII,S$GLB,, | Performed by: RADIOLOGY

## 2021-10-11 PROCEDURE — 1160F RVW MEDS BY RX/DR IN RCRD: CPT | Mod: CPTII,S$GLB,, | Performed by: RADIOLOGY

## 2021-10-11 PROCEDURE — 99212 OFFICE O/P EST SF 10 MIN: CPT | Mod: S$GLB,,, | Performed by: RADIOLOGY

## 2021-10-11 PROCEDURE — 99212 PR OFFICE/OUTPT VISIT, EST, LEVL II, 10-19 MIN: ICD-10-PCS | Mod: S$GLB,,, | Performed by: RADIOLOGY

## 2021-10-11 PROCEDURE — 1159F MED LIST DOCD IN RCRD: CPT | Mod: CPTII,S$GLB,, | Performed by: RADIOLOGY

## 2021-10-11 PROCEDURE — 3078F DIAST BP <80 MM HG: CPT | Mod: CPTII,S$GLB,, | Performed by: RADIOLOGY

## 2021-10-11 PROCEDURE — 3077F PR MOST RECENT SYSTOLIC BLOOD PRESSURE >= 140 MM HG: ICD-10-PCS | Mod: CPTII,S$GLB,, | Performed by: RADIOLOGY

## 2021-10-11 RX ORDER — TAMSULOSIN HYDROCHLORIDE 0.4 MG/1
CAPSULE ORAL
Qty: 90 CAPSULE | Refills: 3 | Status: SHIPPED | OUTPATIENT
Start: 2021-10-11 | End: 2022-05-11

## 2022-05-04 ENCOUNTER — LAB VISIT (OUTPATIENT)
Dept: LAB | Facility: HOSPITAL | Age: 77
End: 2022-05-04
Attending: UROLOGY
Payer: MEDICARE

## 2022-05-04 DIAGNOSIS — C61 PC (PROSTATE CANCER): ICD-10-CM

## 2022-05-04 DIAGNOSIS — C61 CANCER OF PROSTATE: ICD-10-CM

## 2022-05-04 LAB
ANION GAP SERPL CALC-SCNC: 7 MMOL/L (ref 8–16)
BUN SERPL-MCNC: 22 MG/DL (ref 8–23)
CALCIUM SERPL-MCNC: 9.3 MG/DL (ref 8.7–10.5)
CHLORIDE SERPL-SCNC: 108 MMOL/L (ref 95–110)
CO2 SERPL-SCNC: 28 MMOL/L (ref 23–29)
COMPLEXED PSA SERPL-MCNC: 0.29 NG/ML (ref 0–4)
CREAT SERPL-MCNC: 1 MG/DL (ref 0.5–1.4)
EST. GFR  (AFRICAN AMERICAN): >60 ML/MIN/1.73 M^2
EST. GFR  (NON AFRICAN AMERICAN): >60 ML/MIN/1.73 M^2
GLUCOSE SERPL-MCNC: 92 MG/DL (ref 70–110)
POTASSIUM SERPL-SCNC: 3.9 MMOL/L (ref 3.5–5.1)
SODIUM SERPL-SCNC: 143 MMOL/L (ref 136–145)

## 2022-05-04 PROCEDURE — 84153 ASSAY OF PSA TOTAL: CPT | Performed by: RADIOLOGY

## 2022-05-04 PROCEDURE — 80048 BASIC METABOLIC PNL TOTAL CA: CPT | Performed by: UROLOGY

## 2022-05-04 PROCEDURE — 36415 COLL VENOUS BLD VENIPUNCTURE: CPT | Mod: PO | Performed by: RADIOLOGY

## 2022-05-11 ENCOUNTER — OFFICE VISIT (OUTPATIENT)
Dept: RADIATION ONCOLOGY | Facility: CLINIC | Age: 77
End: 2022-05-11
Payer: MEDICARE

## 2022-05-11 VITALS
HEART RATE: 62 BPM | SYSTOLIC BLOOD PRESSURE: 138 MMHG | HEIGHT: 70 IN | DIASTOLIC BLOOD PRESSURE: 82 MMHG | RESPIRATION RATE: 16 BRPM | BODY MASS INDEX: 25.34 KG/M2 | WEIGHT: 177 LBS

## 2022-05-11 DIAGNOSIS — C61 CANCER OF PROSTATE: Primary | ICD-10-CM

## 2022-05-11 PROCEDURE — 1101F PT FALLS ASSESS-DOCD LE1/YR: CPT | Mod: CPTII,S$GLB,, | Performed by: RADIOLOGY

## 2022-05-11 PROCEDURE — 1101F PR PT FALLS ASSESS DOC 0-1 FALLS W/OUT INJ PAST YR: ICD-10-PCS | Mod: CPTII,S$GLB,, | Performed by: RADIOLOGY

## 2022-05-11 PROCEDURE — 3079F PR MOST RECENT DIASTOLIC BLOOD PRESSURE 80-89 MM HG: ICD-10-PCS | Mod: CPTII,S$GLB,, | Performed by: RADIOLOGY

## 2022-05-11 PROCEDURE — 3075F PR MOST RECENT SYSTOLIC BLOOD PRESS GE 130-139MM HG: ICD-10-PCS | Mod: CPTII,S$GLB,, | Performed by: RADIOLOGY

## 2022-05-11 PROCEDURE — 99212 PR OFFICE/OUTPT VISIT, EST, LEVL II, 10-19 MIN: ICD-10-PCS | Mod: S$GLB,,, | Performed by: RADIOLOGY

## 2022-05-11 PROCEDURE — 1160F PR REVIEW ALL MEDS BY PRESCRIBER/CLIN PHARMACIST DOCUMENTED: ICD-10-PCS | Mod: CPTII,S$GLB,, | Performed by: RADIOLOGY

## 2022-05-11 PROCEDURE — 1159F MED LIST DOCD IN RCRD: CPT | Mod: CPTII,S$GLB,, | Performed by: RADIOLOGY

## 2022-05-11 PROCEDURE — 3288F PR FALLS RISK ASSESSMENT DOCUMENTED: ICD-10-PCS | Mod: CPTII,S$GLB,, | Performed by: RADIOLOGY

## 2022-05-11 PROCEDURE — 3075F SYST BP GE 130 - 139MM HG: CPT | Mod: CPTII,S$GLB,, | Performed by: RADIOLOGY

## 2022-05-11 PROCEDURE — 1160F RVW MEDS BY RX/DR IN RCRD: CPT | Mod: CPTII,S$GLB,, | Performed by: RADIOLOGY

## 2022-05-11 PROCEDURE — 99212 OFFICE O/P EST SF 10 MIN: CPT | Mod: S$GLB,,, | Performed by: RADIOLOGY

## 2022-05-11 PROCEDURE — 1125F AMNT PAIN NOTED PAIN PRSNT: CPT | Mod: CPTII,S$GLB,, | Performed by: RADIOLOGY

## 2022-05-11 PROCEDURE — 1159F PR MEDICATION LIST DOCUMENTED IN MEDICAL RECORD: ICD-10-PCS | Mod: CPTII,S$GLB,, | Performed by: RADIOLOGY

## 2022-05-11 PROCEDURE — 3079F DIAST BP 80-89 MM HG: CPT | Mod: CPTII,S$GLB,, | Performed by: RADIOLOGY

## 2022-05-11 PROCEDURE — 3288F FALL RISK ASSESSMENT DOCD: CPT | Mod: CPTII,S$GLB,, | Performed by: RADIOLOGY

## 2022-05-11 PROCEDURE — 1125F PR PAIN SEVERITY QUANTIFIED, PAIN PRESENT: ICD-10-PCS | Mod: CPTII,S$GLB,, | Performed by: RADIOLOGY

## 2022-05-11 PROCEDURE — 99999 PR PBB SHADOW E&M-EST. PATIENT-LVL III: ICD-10-PCS | Mod: PBBFAC,,, | Performed by: RADIOLOGY

## 2022-05-11 PROCEDURE — 99999 PR PBB SHADOW E&M-EST. PATIENT-LVL III: CPT | Mod: PBBFAC,,, | Performed by: RADIOLOGY

## 2022-05-11 RX ORDER — ALBUTEROL SULFATE 90 UG/1
AEROSOL, METERED RESPIRATORY (INHALATION)
COMMUNITY
Start: 2021-07-02 | End: 2022-09-16

## 2022-05-11 NOTE — PROGRESS NOTES
Subjective:       Patient ID: Erasmo Fitch is a 76 y.o. male.    Chief Complaint: Prostate Cancer (7mo f/u;psa)    This patient presents for follow up visit.     Mr. Fitch has a history of Stage I (cT1c, cN0, cM0, PSA: 5.3, Grade Group: 1) adenocarcinoma of the prostate.  He initially presented in May of 2020 with an increased PSA of 5.3 ng/ml.   Biopsies from the Lt. apex, Lt. mid gland, Lt. base and the hypoechoic area at the Rt. base revealed Farwell 6 (3+3) adenocarcinoma. The tumor involved a total of 7 of 14 cores.  MRI of the prostate on 9/11/20 revealed a 2 cm T2 hypointense lesion at the Lt. base/mid gland, PI-RADS 5. There was no extraprostatic extension. There 9 mm T2 hypointense lesion at the Rt. apex, PI-RADS 4. There was no extraprostatic extension. Targeted biopsies per Dr. Duran revealed adenocarcinoma but the Tram score was felt to be 7 (4+3). The patient received a second virtual opinion from [facility] which included a pathology review of his [facility] and [facility] biopsies. The consensus from both [facility] and [facility] was that his disease was Farwell 6 (3+3). He elected to proceed with prostate brachytherapy on 12/21/20.  Today the patient states he feels well.  Recent fall at home, ? related to Flomax, Flexeril.  No  complaints.     Review of Systems   Constitutional: Negative for activity change, appetite change, chills, fatigue and fever.   Gastrointestinal: Negative for abdominal pain, constipation, diarrhea and fecal incontinence.   Genitourinary: Negative for bladder incontinence, difficulty urinating, dysuria, frequency and hematuria.         Objective:      Physical Exam  Constitutional:       General: He is not in acute distress.     Appearance: Normal appearance.   Pulmonary:      Effort: Pulmonary effort is normal. No respiratory distress.   Abdominal:      General: Abdomen is flat. There is no distension.   Neurological:      Mental Status: He is alert and  oriented to person, place, and time.   Psychiatric:         Mood and Affect: Mood normal.         Judgment: Judgment normal.            Latest Reference Range & Units 09/20/21 12:03 05/04/22 08:35   PSA Diagnostic 0.00 - 4.00 ng/mL 0.61 [1] 0.29 [2]      Assessment:       Problem List Items Addressed This Visit     Cancer of prostate - Primary          Plan:       Doing well  no evidence of tumor progression or recurrence.  Explained we would recommend rechecking PSA in 6 - 8 months then annually.

## 2022-09-16 ENCOUNTER — HOSPITAL ENCOUNTER (EMERGENCY)
Facility: HOSPITAL | Age: 77
Discharge: HOME | End: 2022-09-16
Attending: EMERGENCY MEDICINE
Payer: MEDICARE

## 2022-09-16 VITALS
OXYGEN SATURATION: 97 % | SYSTOLIC BLOOD PRESSURE: 133 MMHG | HEIGHT: 70 IN | HEART RATE: 65 BPM | DIASTOLIC BLOOD PRESSURE: 82 MMHG | TEMPERATURE: 98 F | RESPIRATION RATE: 18 BRPM | WEIGHT: 177 LBS | BODY MASS INDEX: 25.34 KG/M2

## 2022-09-16 DIAGNOSIS — M54.2 NECK PAIN: ICD-10-CM

## 2022-09-16 PROCEDURE — 99283 EMERGENCY DEPT VISIT LOW MDM: CPT

## 2022-09-16 RX ORDER — DIAZEPAM 5 MG/1
5 TABLET ORAL EVERY 6 HOURS PRN
COMMUNITY
Start: 2022-06-01 | End: 2022-09-16

## 2022-09-16 RX ORDER — BACLOFEN 10 MG/1
10 TABLET ORAL DAILY
COMMUNITY
Start: 2022-08-26 | End: 2023-01-04

## 2022-09-16 RX ORDER — MAG CARB/ALUMINUM HYDROX/ALGIN 237.5-254
10 SUSPENSION, ORAL (FINAL DOSE FORM) ORAL DAILY PRN
COMMUNITY
End: 2023-01-04

## 2022-09-16 RX ORDER — TAMSULOSIN HYDROCHLORIDE 0.4 MG/1
0.4 CAPSULE ORAL
COMMUNITY
End: 2022-09-16

## 2022-09-16 NOTE — ED PROVIDER NOTES
"Encounter Date: 9/16/2022    SCRIBE #1 NOTE: I, Tal Segundo Jr, am scribing for, and in the presence of,  Camila Malloy MD. I have scribed the following portions of the note - Other sections scribed: HPI, MICHEAL.     History     Chief Complaint   Patient presents with    Post-op Problem     Pt presents to ED today [place] Reports hearing a "crack" yesterday and increase in pain. Pt reports he was unable to reach his surgeon      Erasmo Fitch is a 77 y.o. male who  has a past medical history of basal cell carcinoma, encounter for blood transfusion, gastroesophageal reflux disease, headache, hypertension, and syncope.The patient presents to the ED due to a post-operation issue; onset yesterday. Associated symptoms include bilateral weakness in the lower extremities and neck pain. The patient reports developing sudden neck pain shortly after hearing a "popping" sound in his neck; pain worsens with movement. Patient states having neck surgery done by Dr. Mckinnon on May 30, 2022, and is concerned if a "screw came loose". He also experienced bilateral leg weakness after the incident, but is able to walk. Patient denies numbness and tingling. Patient is allergic to ciprofloxacin and seconal.       The history is provided by the patient. No  was used.   Review of patient's allergies indicates:   Allergen Reactions    Ciprofloxacin (bulk)     Seconal [secobarbital sodium] Other (See Comments)     hallucinations     Past Medical History:   Diagnosis Date    Basal cell carcinoma of forearm 2003    Basal cell carcinoma of forehead 2013    Cancer     Encounter for blood transfusion     40 years ago    GERD (gastroesophageal reflux disease)     Headache(784.0)     Hypertension     Retroperitoneal bleed 1980    Right bundle branch block     Syncope and collapse     Many years ago while on a ladder hanging [facility]. "I got hot"     Past Surgical " History:   Procedure Laterality Date    cervical spine fusion      HIP FRACTURE SURGERY Right 1993    Left shoulder surgery      RADIOACTIVE SEED IMPLANTATION N/A 12/21/2020    Procedure: INSERTION, RADIOACTIVE SEED;  Surgeon: Abhinav Cunha MD;  Location: [facility] OR [address] [place] [address];  Service: Urology;  Laterality: N/A;  45 min    SHOULDER ARTHROSCOPY W/ ROTATOR CUFF REPAIR Right 2014    SHOULDER ARTHROSCOPY W/ ROTATOR CUFF REPAIR Left 2013    TRANSURETHRAL RESECTION OF PROSTATE N/A 2014     Family History   Problem Relation Age of Onset    Heart disease Father     Heart attack Father     Heart disease Brother     Heart attack Brother      Social History     Tobacco Use    Smoking status: Never    Smokeless tobacco: Never   Substance Use Topics    Alcohol use: Yes     Alcohol/week: 6.0 standard drinks     Types: 6 Cans of beer per week     Comment: occ.    Drug use: No     Review of Systems   Constitutional:  Negative for fever.   HENT:  Negative for sore throat.    Respiratory:  Negative for shortness of breath.    Cardiovascular:  Negative for chest pain.   Gastrointestinal:  Negative for nausea.   Genitourinary:  Negative for dysuria.   Musculoskeletal:  Positive for neck pain. Negative for back pain.   Skin:  Negative for rash.   Neurological:  Positive for weakness. Negative for numbness.        Negative tingling.   Hematological:  Does not bruise/bleed easily.     Physical Exam     Initial Vitals [09/16/22 0918]   BP Pulse Resp Temp SpO2   133/82 65 18 98.1 °F (36.7 °C) 97 %      MAP       --         Physical Exam    Nursing note and vitals reviewed.  Constitutional: He appears well-developed and well-nourished.   HENT:   Mouth/Throat: Oropharynx is clear and moist.   Eyes: Pupils are equal, round, and reactive to light.   Neck: Neck supple.   Mild diffuse tenderness to the paraspinous muscles of the cervical spine.   Normal range of motion.  Pulmonary/Chest: Breath sounds normal.   Abdominal: Abdomen is soft. There  is no abdominal tenderness.   Musculoskeletal:         General: No edema. Normal range of motion.      Cervical back: Normal range of motion and neck supple.     Neurological: He is alert and oriented to person, place, and time.   Skin: Skin is warm and dry.       ED Course   Procedures  Labs Reviewed - No data to display       Imaging Results              X-Ray Cervical Spine AP And Lateral (Final result)  Result time 09/16/22 10:02:45      Final result by Gary Renner III, MD (09/16/22 10:02:45)                   Impression:      Degenerative change as above and postoperative change without complication.      Electronically signed by: Gary Renner MD  Date:    09/16/2022  Time:    10:02               Narrative:    EXAMINATION:  XR CERVICAL SPINE AP LATERAL    CLINICAL HISTORY:  Cervicalgia    FINDINGS:  Cervical spine two views: There are pedicle screws and fixation rods between C3 and C7.  There is ACDF focally with hardware at C3-C4.  There is ACDF with a plate vertebral body screws and disc implants at C4-C5, C5-C6, C6-C7.  There is baseline DJD.  No acute trauma seen.                                       Medications - No data to display           Scribe Attestation:   Scribe #1: I performed the above scribed service and the documentation accurately describes the services I performed. I attest to the accuracy of the note.      ED Course as of 09/16/22 1119   Fri Sep 16, 2022   1116 The patient told me he has oxycontin at home. He also has valium and on  he has oxycodone 5 mg tabs. I told him not to take the oxycontin since he hasn't been taking it, he can cut the valium in half if it is too strong and he can take the oxycodone 5 mg tabs q 6 hrs as needed but not together with the valium. [ST]      ED Course User Index  [ST] Camila Malloy MD               I, Camila Malloy, personally performed the services described in this documentation. All medical record entries made by the scribe were at my  direction and in my presence.  I have reviewed the chart and agree that the record reflects my personal performance and is accurate and complete. Camila Malloy M.D. 11:13 AM09/16/2022   Clinical Impression:   Final diagnoses:  [M54.2] Neck pain      ED Disposition Condition    Discharge Stable          ED Prescriptions    None       Follow-up Information       Follow up With Specialties Details Why Contact Info    Follow up with your neurosurgeon. Schedule an appointment                 Camila Malloy MD  09/16/22 [id number]       Camila Malloy MD  09/16/22 0522

## 2022-09-16 NOTE — ED NOTES
Pt ambulated into room 7 from triage. Pt awake and alert and answers questions appropriately. Pt has a soft neck brace in place from home. Pt reports multiple neck surgeries in the past. Pt was turning his head yesterday and felt a pop and had pain to his neck. Pt put on his brace to help with pain. Pt denies any numbness or tingling and pain does not radiate. Plan of care reviewed, call bell within reach.

## 2022-09-16 NOTE — DISCHARGE INSTRUCTIONS
Cut your valium in half and see if this gives you some relief.  Do not start taking oxycontin if you haven't been taking it, you could stop breathing

## 2022-09-16 NOTE — PHARMACY MED REC
"  Admission Medication History     The home medication history was taken by Kimberley Malave CPhT.    Medication history obtained from, Patient Verified    You may go to "Admission" then "Reconcile Home Medications" tabs to review and/or act upon these items.     The home medication list has been updated by the Pharmacy department.   Please read ALL comments highlighted in yellow.   Please address this information as you see fit.    Feel free to contact us if you have any questions or require assistance.      The medications listed below were removed from the home medication list.  Please reorder if appropriate:  Patient reports no longer taking the following medication(s):  Albuterol HFA 90 mcg  Aspirin 81 mg  Celecoxib 400 mg  Cyclobenzaprine 5 mg  Centrum Silver   Diazepam 5 mg  Flomax 0.4 mg      Kimberley Malave CPhT.  Ext [phone]             .        "

## 2022-09-21 ENCOUNTER — TELEPHONE (OUTPATIENT)
Dept: NEUROSURGERY | Facility: CLINIC | Age: 77
End: 2022-09-21
Payer: MEDICARE

## 2022-09-21 NOTE — TELEPHONE ENCOUNTER
Called and spoke with pt. He stated he has MRI disc done outside of [facility] within a year. Has CT scan and Xrays as well. Will bring disc with him to appt.

## 2022-09-26 ENCOUNTER — OFFICE VISIT (OUTPATIENT)
Dept: NEUROSURGERY | Facility: CLINIC | Age: 77
End: 2022-09-26
Payer: MEDICARE

## 2022-09-26 DIAGNOSIS — M48.9 CERVICAL SPINE DISEASE: Primary | ICD-10-CM

## 2022-09-26 DIAGNOSIS — M47.12 CERVICAL SPONDYLOSIS WITH MYELOPATHY: ICD-10-CM

## 2022-09-26 PROCEDURE — 99214 OFFICE O/P EST MOD 30 MIN: CPT | Mod: S$GLB,,, | Performed by: PHYSICIAN ASSISTANT

## 2022-09-26 PROCEDURE — 1101F PT FALLS ASSESS-DOCD LE1/YR: CPT | Mod: CPTII,S$GLB,, | Performed by: PHYSICIAN ASSISTANT

## 2022-09-26 PROCEDURE — 99999 PR PBB SHADOW E&M-EST. PATIENT-LVL II: CPT | Mod: PBBFAC,,, | Performed by: PHYSICIAN ASSISTANT

## 2022-09-26 PROCEDURE — 1101F PR PT FALLS ASSESS DOC 0-1 FALLS W/OUT INJ PAST YR: ICD-10-PCS | Mod: CPTII,S$GLB,, | Performed by: PHYSICIAN ASSISTANT

## 2022-09-26 PROCEDURE — 1160F RVW MEDS BY RX/DR IN RCRD: CPT | Mod: CPTII,S$GLB,, | Performed by: PHYSICIAN ASSISTANT

## 2022-09-26 PROCEDURE — 3288F PR FALLS RISK ASSESSMENT DOCUMENTED: ICD-10-PCS | Mod: CPTII,S$GLB,, | Performed by: PHYSICIAN ASSISTANT

## 2022-09-26 PROCEDURE — 1159F MED LIST DOCD IN RCRD: CPT | Mod: CPTII,S$GLB,, | Performed by: PHYSICIAN ASSISTANT

## 2022-09-26 PROCEDURE — 99999 PR PBB SHADOW E&M-EST. PATIENT-LVL II: ICD-10-PCS | Mod: PBBFAC,,, | Performed by: PHYSICIAN ASSISTANT

## 2022-09-26 PROCEDURE — 1159F PR MEDICATION LIST DOCUMENTED IN MEDICAL RECORD: ICD-10-PCS | Mod: CPTII,S$GLB,, | Performed by: PHYSICIAN ASSISTANT

## 2022-09-26 PROCEDURE — 99214 PR OFFICE/OUTPT VISIT, EST, LEVL IV, 30-39 MIN: ICD-10-PCS | Mod: S$GLB,,, | Performed by: PHYSICIAN ASSISTANT

## 2022-09-26 PROCEDURE — 3288F FALL RISK ASSESSMENT DOCD: CPT | Mod: CPTII,S$GLB,, | Performed by: PHYSICIAN ASSISTANT

## 2022-09-26 PROCEDURE — 1160F PR REVIEW ALL MEDS BY PRESCRIBER/CLIN PHARMACIST DOCUMENTED: ICD-10-PCS | Mod: CPTII,S$GLB,, | Performed by: PHYSICIAN ASSISTANT

## 2022-09-26 NOTE — PROGRESS NOTES
Neurosurgery  History & Physical    SUBJECTIVE:     Chief Complaint: Neck pain    History of Present Illness:  Erasmo Fitch is a 77 y.o. male who presents to clinic for evaluation of chronic, progressive neck pain. Pt has a h/o C4-C7 ACDF done in 2016 by Dr. Lundberg at [facility], reports his main issue at that time was neck pain but that he also had mild numbness/tingling and weakness in the hands bilaterally. Symptoms initially improved after surgery for a while but then neck pain returned and progressively worsened, also noted to have delayed bony fusion. He then underwent C3-C7 posterior cervical fusion by Orthopedic surgeon Dr. Brand at  in January 2021. Reports his pain continued to progressively worsen after PCF and eventually sought another neurosurgeon, was seen by Dr. Mckinnon in [place] who performed C3-4 ACDF on 5/30/22. He presents to clinic today for second opinion regarding his persistent neck pain and is concerned that his hardware may be misplaced. Pain is primarily at the back of the neck, rated 10/10 in intensity, and is worse with any movement of the neck, better when in his recliner. He does get some relief with ibuprofen but tries to avoid using this unless necessary as he is aware it can delay fusion of the bones. Has tried muscle relaxants as well which offer mild relief. Reports he has done formal PT as well as neck injections in the past and did not help at all. Pt endorses subjective weakness in the hands and legs as well as gait instability, which has been chronic but persistent. He reports a fall earlier this year due to unsteadiness, leading to L2 compression fx, s/p kyphoplasty with good pain relief subsequently. Denies numbness, radicular pain into the arms, and bowel/bladder dysfunction. He feels the pain and associated symptoms are severely impacting his quality of life and ability to perform his every day activities. He is interested in further surgery if it may lead to  "improvement and is recommended.    Review of patient's allergies indicates:   Allergen Reactions    Ciprofloxacin     Ciprofloxacin (bulk)     Seconal [secobarbital sodium] Other (See Comments)     hallucinations       Current Outpatient Medications   Medication Sig Dispense Refill    aluminum hydrox-magnesium carb (GAVISCON EXTRA STRENGTH) 254-237.5 mg/5 mL Susp Take 10 mLs by mouth daily as needed.      baclofen (LIORESAL) 10 MG tablet Take 10 mg by mouth once daily.      ibuprofen (ADVIL,MOTRIN) 800 MG tablet Take 800 mg by mouth every 6 (six) hours as needed for Pain.      loperamide (IMODIUM) 2 mg capsule Take 2 mg by mouth 4 (four) times daily as needed for Diarrhea.      pantoprazole (PROTONIX) 40 MG tablet Take 40 mg by mouth 2 (two) times daily.      triamterene-hydrochlorothiazide 37.5-25 mg (DYAZIDE) 37.5-25 mg per capsule Take 1 capsule by mouth every morning.      vitamin D (VITAMIN D3) 1000 units Tab Take 1,000 Units by mouth once daily.       No current facility-administered medications for this visit.       Past Medical History:   Diagnosis Date    Basal cell carcinoma of forearm 2003    Basal cell carcinoma of forehead 2013    Cancer     Encounter for blood transfusion     40 years ago    GERD (gastroesophageal reflux disease)     Headache(784.0)     Hypertension     Retroperitoneal bleed 1980    Right bundle branch block     Syncope and collapse     Many years ago while on a ladder hanging [facility]. "I got hot"     Past Surgical History:   Procedure Laterality Date    cervical spine fusion      HIP FRACTURE SURGERY Right 1993    Left shoulder surgery      RADIOACTIVE SEED IMPLANTATION N/A 12/21/2020    Procedure: INSERTION, RADIOACTIVE SEED;  Surgeon: Abhinav Cunha MD;  Location: [facility] OR [address] [place] [address];  Service: Urology;  Laterality: N/A;  45 min    SHOULDER ARTHROSCOPY W/ ROTATOR CUFF REPAIR Right 2014    SHOULDER ARTHROSCOPY W/ ROTATOR CUFF REPAIR Left 2013    TRANSURETHRAL RESECTION OF PROSTATE " N/A 2014     Family History       Problem Relation (Age of Onset)    Heart attack Father, Brother    Heart disease Father, Brother          Social History     Socioeconomic History    Marital status:    Tobacco Use    Smoking status: Never    Smokeless tobacco: Never   Substance and Sexual Activity    Alcohol use: Yes     Alcohol/week: 6.0 standard drinks     Types: 6 Cans of beer per week     Comment: occ.    Drug use: No       Review of Systems  Positive per HPI, otherwise a pertinent ROS was performed and was negative.        OBJECTIVE:     Vital Signs     There is no height or weight on file to calculate BMI.      Neurosurgery Physical Exam  General: well developed, well nourished, no distress.   Head: normocephalic, atraumatic  Neck: No tracheal deviation. No palpable masses.    Anterior neck incisions x 2 and posterior midline incision well healed.    Decreased ROM in all directions, increased pain with motion  Neurologic: Alert and oriented. Thought content appropriate.  GCS: E4 V5 M6; Total: 15  Mental Status: Awake, Alert, Oriented x 4  Language: No aphasia  Speech: No dysarthria  Cranial nerves: face symmetric, tongue midline, CN II-XII grossly intact.   Eyes: pupils equal, round, reactive to light with accomodation, EOMI.   Pulmonary: normal respirations, no signs of respiratory distress  Abdomen: soft, non-distended, not tender to palpation  Skin: Skin is warm, dry and intact.    Sensory: intact to light touch throughout  Motor Strength: Moves all extremities spontaneously with good tone.  Full strength upper and lower extremities. No abnormal movements seen.     Strength  Deltoids Triceps Biceps Wrist Extension Wrist Flexion Hand    Upper: R 5/5 5/5 5/5 5/5 5/5 5/5    L 5/5 5/5 5/5 5/5 5/5 5/5     Iliopsoas Quadriceps Knee  Flexion Tibialis  anterior Gastro- cnemius EHL   Lower: R 5/5 5/5 5/5 5/5 5/5 5/5    L 5/5 5/5 5/5 5/5 5/5 5/5     Reflexes:   DTR: 2+ symmetrically  throughout.  Newsome's: Negative bilaterally    Gait: stable     Cervical:   ROM: Full with flexion, extension, lateral rotation and ear-to-shoulder bend.   Midline TTP: Positive  Lhermitte's: Negative.         Diagnostic Results:  Imaging was independently reviewed by myself and Dr. Rodriguez    MRI cervical spine 1/18/22:  - Postop changes with artifact from anterior hardware at C4-C7 and posterior hardware from C3-C7. C4-C7 levels appear solidly fused anteriorly. Mild broad based disc bulge and posterior ligamentous hypertrophy at C2-3 with flattening of thecal sac both anteriorly and posteriorly. No significant spinal cord compression throughout.  MRI cervical spine 4/1/22:  - Grossly stable when compared to above  CT cervical spine 2/27/22:  - Anterior and posterior hardware intact without haloing or signs of failure, bilateral posterior pedicle screws from C3-C7 with transfacet placement of C3 screws. There appears to be evidence of fusion posteriorly from C3-C7.  XR cervical spine 9/16/22:  - Previous hardware as described above in stable position with interval addition of C3-C4 anterior disc spacer and fusion hardware.      ASSESSMENT/PLAN:     Erasmo Fitch is a 77 y.o. male s/p C4-C7 ACDF (2016), C3-C7 PCF (2021), and most recently C3-4 ACDF (May 2022) who presents for evaluation of ongoing debilitating neck pain, interested in possible removal of posterior hardware. Imaging shows hardware to be intact with appropriate alignment. Most recent MRI and CT were prior to C3-4 ACDF, however X-rays do show C3-4 anterior cage and screws to be in appropriate position. I have discussed the patient and imaging with Dr. Rodriguez. Do not recommend any additional surgery or intervention at this point in time. Discussed with pt that I recommend allowing more time to pass from recent surgery to allow for anterior fusion to occur, and re-assess his symptoms at that time. He may follow up in about 3 months if desired, offered  to schedule appt, pt would prefer to wait and will contact our office if/when he would like to see us back. Encouraged him to call with any further questions or concerns.      Benita Willis PA-C  Neurosurgery  [facility]

## 2022-10-19 ENCOUNTER — TELEPHONE (OUTPATIENT)
Dept: NEUROSURGERY | Facility: CLINIC | Age: 77
End: 2022-10-19
Payer: MEDICARE

## 2022-10-19 NOTE — TELEPHONE ENCOUNTER
----- Message from Radha Perez sent at 10/19/2022  8:42 AM CDT -----  Regarding: Pt Advice  Contact: pt  Type:  Needs Medical Advice    Who Called : pt  Would the patient rather a call back or a response via [facility]?  RAFA  Best Call Back Number: [phone]  Additional Information:  Pt would Like To Speak w/ you in regards His MRI, CT and Xray Images , Pt states that he need Those Images Mailed back To Him , If Images are still at Your Office he can Come To Your Office and Get The Images

## 2022-11-28 ENCOUNTER — OFFICE VISIT (OUTPATIENT)
Dept: NEUROSURGERY | Facility: CLINIC | Age: 77
End: 2022-11-28
Payer: MEDICARE

## 2022-11-28 VITALS — SYSTOLIC BLOOD PRESSURE: 152 MMHG | DIASTOLIC BLOOD PRESSURE: 96 MMHG | HEART RATE: 60 BPM

## 2022-11-28 DIAGNOSIS — M47.12 CERVICAL SPONDYLOSIS WITH MYELOPATHY: Primary | ICD-10-CM

## 2022-11-28 PROCEDURE — 1101F PT FALLS ASSESS-DOCD LE1/YR: CPT | Mod: CPTII,S$GLB,, | Performed by: STUDENT IN AN ORGANIZED HEALTH CARE EDUCATION/TRAINING PROGRAM

## 2022-11-28 PROCEDURE — 3080F DIAST BP >= 90 MM HG: CPT | Mod: CPTII,S$GLB,, | Performed by: STUDENT IN AN ORGANIZED HEALTH CARE EDUCATION/TRAINING PROGRAM

## 2022-11-28 PROCEDURE — 3288F PR FALLS RISK ASSESSMENT DOCUMENTED: ICD-10-PCS | Mod: CPTII,S$GLB,, | Performed by: STUDENT IN AN ORGANIZED HEALTH CARE EDUCATION/TRAINING PROGRAM

## 2022-11-28 PROCEDURE — 99999 PR PBB SHADOW E&M-EST. PATIENT-LVL III: CPT | Mod: PBBFAC,,, | Performed by: STUDENT IN AN ORGANIZED HEALTH CARE EDUCATION/TRAINING PROGRAM

## 2022-11-28 PROCEDURE — 3080F PR MOST RECENT DIASTOLIC BLOOD PRESSURE >= 90 MM HG: ICD-10-PCS | Mod: CPTII,S$GLB,, | Performed by: STUDENT IN AN ORGANIZED HEALTH CARE EDUCATION/TRAINING PROGRAM

## 2022-11-28 PROCEDURE — 1125F AMNT PAIN NOTED PAIN PRSNT: CPT | Mod: CPTII,S$GLB,, | Performed by: STUDENT IN AN ORGANIZED HEALTH CARE EDUCATION/TRAINING PROGRAM

## 2022-11-28 PROCEDURE — 99999 PR PBB SHADOW E&M-EST. PATIENT-LVL III: ICD-10-PCS | Mod: PBBFAC,,, | Performed by: STUDENT IN AN ORGANIZED HEALTH CARE EDUCATION/TRAINING PROGRAM

## 2022-11-28 PROCEDURE — 3288F FALL RISK ASSESSMENT DOCD: CPT | Mod: CPTII,S$GLB,, | Performed by: STUDENT IN AN ORGANIZED HEALTH CARE EDUCATION/TRAINING PROGRAM

## 2022-11-28 PROCEDURE — 99214 OFFICE O/P EST MOD 30 MIN: CPT | Mod: S$GLB,,, | Performed by: STUDENT IN AN ORGANIZED HEALTH CARE EDUCATION/TRAINING PROGRAM

## 2022-11-28 PROCEDURE — 3077F SYST BP >= 140 MM HG: CPT | Mod: CPTII,S$GLB,, | Performed by: STUDENT IN AN ORGANIZED HEALTH CARE EDUCATION/TRAINING PROGRAM

## 2022-11-28 PROCEDURE — 99214 PR OFFICE/OUTPT VISIT, EST, LEVL IV, 30-39 MIN: ICD-10-PCS | Mod: S$GLB,,, | Performed by: STUDENT IN AN ORGANIZED HEALTH CARE EDUCATION/TRAINING PROGRAM

## 2022-11-28 PROCEDURE — 1101F PR PT FALLS ASSESS DOC 0-1 FALLS W/OUT INJ PAST YR: ICD-10-PCS | Mod: CPTII,S$GLB,, | Performed by: STUDENT IN AN ORGANIZED HEALTH CARE EDUCATION/TRAINING PROGRAM

## 2022-11-28 PROCEDURE — 1125F PR PAIN SEVERITY QUANTIFIED, PAIN PRESENT: ICD-10-PCS | Mod: CPTII,S$GLB,, | Performed by: STUDENT IN AN ORGANIZED HEALTH CARE EDUCATION/TRAINING PROGRAM

## 2022-11-28 PROCEDURE — 3077F PR MOST RECENT SYSTOLIC BLOOD PRESSURE >= 140 MM HG: ICD-10-PCS | Mod: CPTII,S$GLB,, | Performed by: STUDENT IN AN ORGANIZED HEALTH CARE EDUCATION/TRAINING PROGRAM

## 2022-11-28 NOTE — PROGRESS NOTES
Neurosurgery  Established Patient    SUBJECTIVE:     History of Present Illness:  Erasmo Fitch is a 77 y.o. male who presents to clinic for evaluation of chronic, progressive neck pain. Pt has a h/o C4-C7 ACDF done in 2016 by Dr. Lundberg at [facility], reports his main issue at that time was neck pain but that he also had mild numbness/tingling and weakness in the hands bilaterally. Symptoms initially improved after surgery for a while but then neck pain returned and progressively worsened, also noted to have delayed bony fusion. He then underwent C3-C7 posterior cervical fusion by Orthopedic surgeon Dr. Brand at  in January 2021. Reports his pain continued to progressively worsen after PCF and eventually sought another neurosurgeon, was seen by Dr. Mckinnon in [place] who performed C3-4 ACDF on 5/30/22. He presents to clinic today for second opinion regarding his persistent neck pain and is concerned that his hardware may be misplaced. Pain is primarily at the back of the neck, rated 10/10 in intensity, and is worse with any movement of the neck, better when in his recliner. He does get some relief with ibuprofen but tries to avoid using this unless necessary as he is aware it can delay fusion of the bones. Has tried muscle relaxants as well which offer mild relief. Reports he has done formal PT as well as neck injections in the past and did not help at all. Pt endorses subjective weakness in the hands and legs as well as gait instability, which has been chronic but persistent. He reports a fall earlier this year due to unsteadiness, leading to L2 compression fx, s/p kyphoplasty with good pain relief subsequently. Denies numbness, radicular pain into the arms, and bowel/bladder dysfunction. He feels the pain and associated symptoms are severely impacting his quality of life and ability to perform his every day activities. He is interested in further surgery if it may lead to improvement and is  recommended.     Interval fu 11/28/22:  Pt continues to endorse neck pain primarily located at CT jxn.  Neck pain is alleviated when he lays back onto recliner or when his neck is supported.  He has no radicular arm pain.  He has intermittent tingling in bilateral arms.  He has chronic gait instability but this hasn't worsened.  He denies difficulty buttoning his shirt or using keys.  He has done PT w/o relief.  He was scheduled for C7/T1 DEBORA but hasn't gone yet.  He notices that it has been more difficult for him to hold his head up.    Review of patient's allergies indicates:   Allergen Reactions    Ciprofloxacin     Ciprofloxacin (bulk)     Seconal [secobarbital sodium] Other (See Comments)     hallucinations       Current Outpatient Medications   Medication Sig Dispense Refill    aluminum hydrox-magnesium carb (GAVISCON EXTRA STRENGTH) 254-237.5 mg/5 mL Susp Take 10 mLs by mouth daily as needed.      baclofen (LIORESAL) 10 MG tablet Take 10 mg by mouth once daily.      ibuprofen (ADVIL,MOTRIN) 800 MG tablet Take 800 mg by mouth every 6 (six) hours as needed for Pain.      loperamide (IMODIUM) 2 mg capsule Take 2 mg by mouth 4 (four) times daily as needed for Diarrhea.      pantoprazole (PROTONIX) 40 MG tablet Take 40 mg by mouth 2 (two) times daily.      triamterene-hydrochlorothiazide 37.5-25 mg (DYAZIDE) 37.5-25 mg per capsule Take 1 capsule by mouth every morning.      vitamin D (VITAMIN D3) 1000 units Tab Take 1,000 Units by mouth once daily.       No current facility-administered medications for this visit.       Past Medical History:   Diagnosis Date    Basal cell carcinoma of forearm 2003    Basal cell carcinoma of forehead 2013    Cancer     Encounter for blood transfusion     40 years ago    GERD (gastroesophageal reflux disease)     Headache(784.0)     Hypertension     Retroperitoneal bleed 1980    Right bundle branch block     Syncope and collapse     Many years ago while on a ladder hanging  "[facility]. "I got hot"     Past Surgical History:   Procedure Laterality Date    cervical spine fusion      HIP FRACTURE SURGERY Right 1993    Left shoulder surgery      RADIOACTIVE SEED IMPLANTATION N/A 12/21/2020    Procedure: INSERTION, RADIOACTIVE SEED;  Surgeon: Abhinav Cunha MD;  Location: [facility] OR [address] [place] [address];  Service: Urology;  Laterality: N/A;  45 min    SHOULDER ARTHROSCOPY W/ ROTATOR CUFF REPAIR Right 2014    SHOULDER ARTHROSCOPY W/ ROTATOR CUFF REPAIR Left 2013    TRANSURETHRAL RESECTION OF PROSTATE N/A 2014     Family History       Problem Relation (Age of Onset)    Heart attack Father, Brother    Heart disease Father, Brother          Social History     Socioeconomic History    Marital status:    Tobacco Use    Smoking status: Never    Smokeless tobacco: Never   Substance and Sexual Activity    Alcohol use: Yes     Alcohol/week: 6.0 standard drinks     Types: 6 Cans of beer per week     Comment: occ.    Drug use: No       Review of Systems  14 point ROS was negative    OBJECTIVE:     Vital Signs  Pulse: 60  BP: (!) 152/96  Pain Score:   8  There is no height or weight on file to calculate BMI.    Physical Exam:    Constitutional: He appears well-developed and well-nourished.     Eyes: Pupils are equal, round, and reactive to light.     Cardiovascular: Normal rate and regular rhythm.     Abdominal: Soft.     Psych/Behavior: He is alert. He is oriented to person, place, and time. He has a normal mood and affect.     Musculoskeletal: Gait is abnormal.        Neck: Range of motion is limited.        Back: Range of motion is limited.     Neurological:        Coordination: He has an abnormal Romberg Test and abnormal tandem walking coordination.        Sensory: There is no sensory deficit in the trunk. There is no sensory deficit in the extremities.        DTRs: Bicep reflexes are 2+ on the right side and 2+ on the left side. Patellar reflexes are 2+ on the right side and 2+ on the left side.        " Cranial nerves: Cranial nerve(s) II, III, IV, V, VI, VII, VIII, IX, X, XI and XII are intact.     5/5 in BUE except 4+/5 in bilateral HI  5/5 in BLE    Able to stand on heels/toes    +TTP at cervicothoracic jxn bilaterally    Post cervical incision well healed.    Diagnostic Results:  CT c spine: s/p C4-7 ACDF and C3-7 post decompression/fusion.  No hardware failure.  Solid ant fusion from C4-7.  Bilateral C3 lateral mass screws appear to be fixated into C3/4 joints.  No haloing of hardware.  MRI C spine: moderate central canal stenosis at C2/3  C spine xrays: s/p C3/4 standalone ACDF and C4-7 ACDF.  No failure.  S/p C3-7 post cervical decompression/fusion.  No failure.  Cervicothoracic kyphosis at the CT jxn.  Reviewed    ASSESSMENT/PLAN:     77 M s/p multiple cervical fusions most recent C3/4 ACDF.  He continues to have posterior neck pain located at cervicothoracic jxn.  His myelopathy appears chronic.  His imaging is described above and he appears to have developed worsened kyphosis at the CT jxn.  I would like to update his imaging prior to discussing potential surgical interventions.  I would like him to try C7/T1 facet injections as well to see if there is any improvement in his pain.  -Scoli  -Flex/ex c spine  -CT c spine  -MRI c spine  -Pain mgmt for C7/T1 facet injections  -Fu after imaging

## 2022-12-28 ENCOUNTER — LAB VISIT (OUTPATIENT)
Dept: LAB | Facility: HOSPITAL | Age: 77
End: 2022-12-28
Attending: RADIOLOGY
Payer: MEDICARE

## 2022-12-28 DIAGNOSIS — C61 CANCER OF PROSTATE: ICD-10-CM

## 2022-12-28 LAB — COMPLEXED PSA SERPL-MCNC: 0.14 NG/ML (ref 0–4)

## 2022-12-28 PROCEDURE — 84153 ASSAY OF PSA TOTAL: CPT | Performed by: RADIOLOGY

## 2022-12-28 PROCEDURE — 36415 COLL VENOUS BLD VENIPUNCTURE: CPT | Mod: PO | Performed by: RADIOLOGY

## 2023-01-04 ENCOUNTER — OFFICE VISIT (OUTPATIENT)
Dept: RADIATION ONCOLOGY | Facility: CLINIC | Age: 78
End: 2023-01-04
Payer: MEDICARE

## 2023-01-04 VITALS
WEIGHT: 172.5 LBS | DIASTOLIC BLOOD PRESSURE: 82 MMHG | BODY MASS INDEX: 24.75 KG/M2 | SYSTOLIC BLOOD PRESSURE: 134 MMHG | RESPIRATION RATE: 18 BRPM | HEART RATE: 66 BPM

## 2023-01-04 DIAGNOSIS — C61 CANCER OF PROSTATE: Primary | ICD-10-CM

## 2023-01-04 PROCEDURE — 3075F PR MOST RECENT SYSTOLIC BLOOD PRESS GE 130-139MM HG: ICD-10-PCS | Mod: CPTII,S$GLB,, | Performed by: RADIOLOGY

## 2023-01-04 PROCEDURE — 1126F PR PAIN SEVERITY QUANTIFIED, NO PAIN PRESENT: ICD-10-PCS | Mod: CPTII,S$GLB,, | Performed by: RADIOLOGY

## 2023-01-04 PROCEDURE — 99999 PR PBB SHADOW E&M-EST. PATIENT-LVL III: ICD-10-PCS | Mod: PBBFAC,,, | Performed by: RADIOLOGY

## 2023-01-04 PROCEDURE — 1101F PR PT FALLS ASSESS DOC 0-1 FALLS W/OUT INJ PAST YR: ICD-10-PCS | Mod: CPTII,S$GLB,, | Performed by: RADIOLOGY

## 2023-01-04 PROCEDURE — 1159F PR MEDICATION LIST DOCUMENTED IN MEDICAL RECORD: ICD-10-PCS | Mod: CPTII,S$GLB,, | Performed by: RADIOLOGY

## 2023-01-04 PROCEDURE — 3079F PR MOST RECENT DIASTOLIC BLOOD PRESSURE 80-89 MM HG: ICD-10-PCS | Mod: CPTII,S$GLB,, | Performed by: RADIOLOGY

## 2023-01-04 PROCEDURE — 1160F PR REVIEW ALL MEDS BY PRESCRIBER/CLIN PHARMACIST DOCUMENTED: ICD-10-PCS | Mod: CPTII,S$GLB,, | Performed by: RADIOLOGY

## 2023-01-04 PROCEDURE — 1126F AMNT PAIN NOTED NONE PRSNT: CPT | Mod: CPTII,S$GLB,, | Performed by: RADIOLOGY

## 2023-01-04 PROCEDURE — 3079F DIAST BP 80-89 MM HG: CPT | Mod: CPTII,S$GLB,, | Performed by: RADIOLOGY

## 2023-01-04 PROCEDURE — 99999 PR PBB SHADOW E&M-EST. PATIENT-LVL III: CPT | Mod: PBBFAC,,, | Performed by: RADIOLOGY

## 2023-01-04 PROCEDURE — 1159F MED LIST DOCD IN RCRD: CPT | Mod: CPTII,S$GLB,, | Performed by: RADIOLOGY

## 2023-01-04 PROCEDURE — 3075F SYST BP GE 130 - 139MM HG: CPT | Mod: CPTII,S$GLB,, | Performed by: RADIOLOGY

## 2023-01-04 PROCEDURE — 99212 PR OFFICE/OUTPT VISIT, EST, LEVL II, 10-19 MIN: ICD-10-PCS | Mod: S$GLB,,, | Performed by: RADIOLOGY

## 2023-01-04 PROCEDURE — 3288F FALL RISK ASSESSMENT DOCD: CPT | Mod: CPTII,S$GLB,, | Performed by: RADIOLOGY

## 2023-01-04 PROCEDURE — 1160F RVW MEDS BY RX/DR IN RCRD: CPT | Mod: CPTII,S$GLB,, | Performed by: RADIOLOGY

## 2023-01-04 PROCEDURE — 3288F PR FALLS RISK ASSESSMENT DOCUMENTED: ICD-10-PCS | Mod: CPTII,S$GLB,, | Performed by: RADIOLOGY

## 2023-01-04 PROCEDURE — 1101F PT FALLS ASSESS-DOCD LE1/YR: CPT | Mod: CPTII,S$GLB,, | Performed by: RADIOLOGY

## 2023-01-04 PROCEDURE — 99212 OFFICE O/P EST SF 10 MIN: CPT | Mod: S$GLB,,, | Performed by: RADIOLOGY

## 2023-01-04 RX ORDER — SILDENAFIL 100 MG/1
100 TABLET, FILM COATED ORAL DAILY PRN
Qty: 10 TABLET | Refills: 6 | Status: SHIPPED | OUTPATIENT
Start: 2023-01-04 | End: 2023-02-03

## 2023-01-04 NOTE — PROGRESS NOTES
Subjective:       Patient ID: Erasmo Fitch is a 77 y.o. male.    Chief Complaint: Prostate Cancer (F/u)    This patient presents for follow up visit.     Mr. Fitch has a history of Stage I (cT1c, cN0, cM0, PSA: 5.3, Grade Group: 1) adenocarcinoma of the prostate.  He presented in May of 2020 with a PSA of 5.3 ng/ml.   Biopsies from the Lt. apex, Lt. mid gland, Lt. base and the hypoechoic area at the Rt. base revealed Helton 6 (3+3) adenocarcinoma. The tumor involved a total of 7 of 14 cores.  MRI of the prostate on 9/11/20 revealed a 2 cm T2 hypointense lesion at the Lt. base/mid gland, PI-RADS 5. There was no extraprostatic extension. There 9 mm T2 hypointense lesion at the Rt. apex, PI-RADS 4. There was no extraprostatic extension. Targeted biopsies per Dr. Duran revealed adenocarcinoma but the Tram score was felt to be 7 (4+3). The patient received a second virtual opinion from [facility] [facility]. The consensus from both [facility] and [facility] was that his disease was Tram 6 (3+3). He elected to proceed with prostate brachytherapy on 12/21/20.  He has remained RONNIE since that time.  Today the patient states he feels well.  No significant complaints.  Notes some dribbling at the end of urination.     Review of Systems   Constitutional:  Negative for activity change, appetite change, chills and fatigue.   Gastrointestinal:  Negative for abdominal pain, constipation, diarrhea and fecal incontinence.   Genitourinary:  Positive for erectile dysfunction. Negative for bladder incontinence, difficulty urinating, dysuria, frequency and hematuria.       Objective:      Physical Exam  Constitutional:       General: He is not in acute distress.     Appearance: Normal appearance.   Abdominal:      General: Abdomen is flat. There is no distension.   Neurological:      Mental Status: He is alert and oriented to person, place, and time.    Psychiatric:         Mood and Affect: Mood normal.         Judgment: Judgment normal.        Latest Reference Range & Units 05/04/22 08:35 12/28/22 10:32   PSA Diagnostic 0.00 - 4.00 ng/mL 0.29 0.14     Assessment:       Prostate cancer         Plan:       Doing well no evidence of tumor progression or recurrence.   Plan follow up in 1 year with PSA.  Viagra Rx renewed for ED.

## 2024-01-18 ENCOUNTER — LAB VISIT (OUTPATIENT)
Dept: LAB | Facility: HOSPITAL | Age: 79
End: 2024-01-18
Attending: RADIOLOGY
Payer: MEDICARE

## 2024-01-18 DIAGNOSIS — C61 CANCER OF PROSTATE: ICD-10-CM

## 2024-01-18 LAB — COMPLEXED PSA SERPL-MCNC: 0.1 NG/ML (ref 0–4)

## 2024-01-18 PROCEDURE — 36415 COLL VENOUS BLD VENIPUNCTURE: CPT | Mod: PO | Performed by: RADIOLOGY

## 2024-01-18 PROCEDURE — 84153 ASSAY OF PSA TOTAL: CPT | Performed by: RADIOLOGY

## 2024-01-29 ENCOUNTER — OFFICE VISIT (OUTPATIENT)
Dept: RADIATION ONCOLOGY | Facility: CLINIC | Age: 79
End: 2024-01-29
Attending: RADIOLOGY
Payer: MEDICARE

## 2024-01-29 DIAGNOSIS — C61 CANCER OF PROSTATE: Primary | ICD-10-CM

## 2024-01-29 PROCEDURE — 1160F RVW MEDS BY RX/DR IN RCRD: CPT | Mod: CPTII,95,, | Performed by: RADIOLOGY

## 2024-01-29 PROCEDURE — 99212 OFFICE O/P EST SF 10 MIN: CPT | Mod: 95,,, | Performed by: RADIOLOGY

## 2024-01-29 PROCEDURE — 1159F MED LIST DOCD IN RCRD: CPT | Mod: CPTII,95,, | Performed by: RADIOLOGY

## 2024-01-29 NOTE — PROGRESS NOTES
The patient location is: home  The chief complaint leading to consultation is: prostate cancer     Visit type: audiovisual    Face to Face time with patient: 15 minute  25 minutes of total time spent on the encounter, which includes face to face time and non-face to face time preparing to see the patient (eg, review of tests), Obtaining and/or reviewing separately obtained history, Documenting clinical information in the electronic or other health record, Independently interpreting results (not separately reported) and communicating results to the patient/family/caregiver, or Care coordination (not separately reported).     Each patient to whom he or she provides medical services by telemedicine is:  (1) informed of the relationship between the physician and patient and the respective role of any other health care provider with respect to management of the patient; and (2) notified that he or she may decline to receive medical services by telemedicine and may withdraw from such care at any time.    [facility] Radiation Oncology     Patient ID: Erasmo Fitch is a 78 y.o. male.    Chief Complaint: No chief complaint on file.    Mr. Fitch has a history of Stage I (cT1c, cN0, cM0, PSA: 5.3, Grade Group: 1) adenocarcinoma of the prostate.  He presented in May of 2020 with a PSA of 5.3 ng/ml.   Biopsies from the Lt. apex, Lt. mid gland, Lt. base and the hypoechoic area at the Rt. base revealed Semora 6 (3+3) adenocarcinoma.  MRI revealed a 2 cm PI-RADS % lesion at the Lt. base/mid gland with no extraprostatic extension. There 9 mm PI-RADS 4 lesion at the Rt. apex with no extraprostatic extension.   He elected to proceed with prostate brachytherapy on 12/21/20.  He has remained RONNIE since that time.  Today the patient states he feels well.  No significant complaints.        Review of Systems   Constitutional:  Positive for fatigue. Negative for activity change, appetite change and chills.    Gastrointestinal:  Negative for abdominal pain, change in bowel habit, constipation and diarrhea.   Genitourinary:  Positive for difficulty urinating (notes slow stream, nocturia once per night.  Unable to tolerate Flomax) and erectile dysfunction. Negative for bladder incontinence, dysuria, flank pain and hematuria.     Physical Exam  Constitutional:       General: He is not in acute distress.     Appearance: Normal appearance.   Neurological:      Mental Status: He is alert and oriented to person, place, and time.   Psychiatric:         Mood and Affect: Mood normal.         Judgment: Judgment normal.        Latest Reference Range & Units 01/18/24 11:36   PSA Diagnostic 0.00 - 4.00 ng/mL 0.10          Assessment and Plan    Prostate cancer - no evidence of recurrent or progressive disease.  Plan follow up in 1 year with PSA

## 2024-04-16 ENCOUNTER — OFFICE VISIT (OUTPATIENT)
Dept: URGENT CARE | Facility: CLINIC | Age: 79
End: 2024-04-16
Payer: MEDICARE

## 2024-04-16 VITALS
RESPIRATION RATE: 20 BRPM | WEIGHT: 172 LBS | DIASTOLIC BLOOD PRESSURE: 83 MMHG | HEIGHT: 70 IN | SYSTOLIC BLOOD PRESSURE: 145 MMHG | HEART RATE: 63 BPM | TEMPERATURE: 98 F | OXYGEN SATURATION: 97 % | BODY MASS INDEX: 24.62 KG/M2

## 2024-04-16 DIAGNOSIS — T14.8XXA INFECTED AVULSION OF SKIN: Primary | ICD-10-CM

## 2024-04-16 DIAGNOSIS — Z23 NEED FOR TD VACCINE: ICD-10-CM

## 2024-04-16 DIAGNOSIS — S50.811A ABRASION OF RIGHT FOREARM, INITIAL ENCOUNTER: ICD-10-CM

## 2024-04-16 DIAGNOSIS — L08.9 INFECTED AVULSION OF SKIN: Primary | ICD-10-CM

## 2024-04-16 PROCEDURE — 99213 OFFICE O/P EST LOW 20 MIN: CPT | Mod: 25,S$GLB,, | Performed by: NURSE PRACTITIONER

## 2024-04-16 PROCEDURE — 90471 IMMUNIZATION ADMIN: CPT | Mod: S$GLB,,, | Performed by: FAMILY MEDICINE

## 2024-04-16 PROCEDURE — 90714 TD VACC NO PRESV 7 YRS+ IM: CPT | Mod: S$GLB,,, | Performed by: FAMILY MEDICINE

## 2024-04-16 RX ORDER — MUPIROCIN 20 MG/G
OINTMENT TOPICAL 2 TIMES DAILY
Qty: 30 G | Refills: 0 | Status: SHIPPED | OUTPATIENT
Start: 2024-04-16 | End: 2024-04-21

## 2024-04-16 RX ORDER — DOXYCYCLINE 100 MG/1
100 CAPSULE ORAL EVERY 12 HOURS
Qty: 14 CAPSULE | Refills: 0 | Status: SHIPPED | OUTPATIENT
Start: 2024-04-16 | End: 2024-04-23

## 2024-04-16 NOTE — PROGRESS NOTES
"Subjective:      Patient ID: Erasmo Fitch is a 78 y.o. male.    Vitals:  height is 5' 10" (1.778 m) and weight is 78 kg (172 lb). His temperature is 97.6 °F (36.4 °C). His blood pressure is 145/83 (abnormal) and his pulse is 63. His respiration is 20 and oxygen saturation is 97%.     Chief Complaint: Abrasion    This is a 78 y.o. male   who presents today with a chief complaint of right upper arm abrasion that happened two days ago. He states that he hit his arm against a 2x4 brace in his attic and scraped his arm. He's been using peroxide and neosporin to help relieve his symptoms. Last tetanus 2015,  denies fever, body aches or chills, denies cough, wheezing or shortness of breath, denies nausea, vomiting, diarrhea or abdominal pain, denies chest pain or dizziness positional lightheadedness, denies sore throat or trouble swallowing, denies loss of taste or smell, or any other symptoms       Laceration   The incident occurred 2 days ago. The laceration is located on the Right arm. The pain is at a severity of 0/10. The patient is experiencing no pain. The pain has been Constant since onset. He reports no foreign bodies present. His tetanus status is UTD.     Skin:  Positive for abrasion. Negative for laceration and erythema.      Objective:     Physical Exam   Constitutional: He is oriented to person, place, and time. He appears well-developed.   HENT:   Head: Normocephalic and atraumatic. Head is without abrasion, without contusion and without laceration.   Ears:   Right Ear: External ear normal.   Left Ear: External ear normal.   Nose: Nose normal.   Mouth/Throat: Oropharynx is clear and moist and mucous membranes are normal.   Eyes: Conjunctivae, EOM and lids are normal. Pupils are equal, round, and reactive to light.   Neck: Trachea normal and phonation normal. Neck supple.   Cardiovascular: Normal rate, regular rhythm and normal heart sounds.   Pulmonary/Chest: Effort normal and breath sounds normal. No " stridor. No respiratory distress.   Musculoskeletal: Normal range of motion.         General: Normal range of motion.      Comments: Skin abrasion/avulsion noted to right upper forearm with some scant drainage noted to skin avulsion, no erythema or swelling noted, Full rom intact of right arm   Neurological: He is alert and oriented to person, place, and time.   Skin: Skin is warm, dry, intact and no rash. Capillary refill takes less than 2 seconds. No abrasion, No burn, No bruising, No erythema and No ecchymosis   Psychiatric: His speech is normal and behavior is normal. Judgment and thought content normal.   Nursing note and vitals reviewed.            Patient in no acute distress.  Vitals reassuring.  Wound cleaned in clinic.  Detailed education provided about wound care.  Tetanus updated in clinic.  Will cover with doxycycline.  Discussed results/diagnosis/plan in depth with patient in clinic. Strict precautions given to patient to monitor for worsening signs and symptoms. Advised to follow up with primary.All questions answered. Strict ER precautions given. If your symptoms worsens or fail to improve you should go to the Emergency Room. Discharge and follow-up instructions given verbally/printed. Discharge and follow-up instructions discussed with the patient who expressed understanding and willingness to comply with my recommendations.Patient voiced understanding and in agreement with current treatment plan.     Please be advised this text was dictated with [employer] software and may contain errors due to translation.   Assessment:     1. Infected avulsion of skin    2. Need for Td vaccine    3. Abrasion of right forearm, initial encounter        Plan:       Infected avulsion of skin    Need for Td vaccine  -     (In Office Administered) Td Vaccine    Abrasion of right forearm, initial encounter  -     doxycycline (VIBRAMYCIN) 100 MG Cap; Take 1 capsule (100 mg total) by mouth every 12 (twelve) hours. for 7 days   Dispense: 14 capsule; Refill: 0    Other orders  -     mupirocin (BACTROBAN) 2 % ointment; Apply topically 2 (two) times daily. for 5 days  Dispense: 30 g; Refill: 0                  Patient Instructions   PLEASE READ YOUR DISCHARGE INSTRUCTIONS ENTIRELY AS IT CONTAINS IMPORTANT INFORMATION.    Please take the antibiotics to completion.   Use the antibiotic ointment to the wound.       Please return or see your primary care doctor for signs of worsening infection (fever, worsening swelling/redness/pain, inability to move your extremity).    Please arrange follow up with your primary medical clinic as soon as possible. You must understand that you've received an Urgent Care treatment only and that you may be released before all of your medical problems are known or treated. You, the patient, will arrange for follow up as instructed. If your symptoms worsen or fail to improve you should go to the Emergency Room.  WE CANNOT RULE OUT ALL POSSIBLE CAUSES OF YOUR SYMPTOMS IN THE URGENT CARE SETTING PLEASE GO TO THE ER IF YOU FEELS YOUR CONDITION IS WORSENING OR YOU WOULD LIKE EMERGENT EVALUATION.

## 2024-08-17 ENCOUNTER — OFFICE VISIT (OUTPATIENT)
Dept: URGENT CARE | Facility: CLINIC | Age: 79
End: 2024-08-17
Payer: MEDICARE

## 2024-08-17 VITALS
HEIGHT: 70 IN | RESPIRATION RATE: 20 BRPM | WEIGHT: 172 LBS | SYSTOLIC BLOOD PRESSURE: 120 MMHG | DIASTOLIC BLOOD PRESSURE: 76 MMHG | HEART RATE: 66 BPM | OXYGEN SATURATION: 94 % | BODY MASS INDEX: 24.62 KG/M2 | TEMPERATURE: 98 F

## 2024-08-17 DIAGNOSIS — B02.9 HERPES ZOSTER WITHOUT COMPLICATION: Primary | ICD-10-CM

## 2024-08-17 PROCEDURE — 99213 OFFICE O/P EST LOW 20 MIN: CPT | Mod: S$GLB,,, | Performed by: SURGERY

## 2024-08-17 RX ORDER — VALACYCLOVIR HYDROCHLORIDE 1 G/1
1000 TABLET, FILM COATED ORAL 3 TIMES DAILY
Qty: 21 TABLET | Refills: 0 | Status: SHIPPED | OUTPATIENT
Start: 2024-08-17 | End: 2024-08-24

## 2024-08-17 NOTE — PROGRESS NOTES
"Subjective:      Patient ID: Erasmo Fitch is a 79 y.o. male.    Vitals:  height is 5' 10" (1.778 m) and weight is 78 kg (172 lb). His temperature is 98 °F (36.7 °C). His blood pressure is 120/76 and his pulse is 66. His respiration is 20 and oxygen saturation is 94% (abnormal).     Chief Complaint: Rash    This is a 79 y.o. male   who presents today with a chief complaint of a rash that began a week ago. The rash is located on his right arm. He's complaining of pain, redness and itching. He's been using cortisone cream to help relieve his symptoms.     Rash  This is a new problem. The current episode started 1 to 4 weeks ago. The problem has been gradually worsening since onset. The affected locations include the right arm. The rash is characterized by pain, itchiness and redness. He was exposed to nothing. Pertinent negatives include no anorexia, congestion, cough, diarrhea, eye pain, facial edema, fatigue, fever, joint pain, nail changes, rhinorrhea, shortness of breath, sore throat or vomiting. Treatments tried: cortisone cream. The treatment provided no relief.     Constitution: Negative for fatigue and fever.   HENT:  Negative for congestion and sore throat.    Eyes:  Negative for eye pain.   Respiratory:  Negative for cough and shortness of breath.    Gastrointestinal:  Negative for vomiting and diarrhea.   Skin:  Positive for rash. Negative for erythema.   Allergic/Immunologic: Positive for itching.      Objective:     Physical Exam   Constitutional: He is oriented to person, place, and time. He appears well-developed.   HENT:   Head: Normocephalic and atraumatic. Head is without abrasion, without contusion and without laceration.   Ears:   Right Ear: External ear normal.   Left Ear: External ear normal.   Nose: Nose normal.   Mouth/Throat: Oropharynx is clear and moist and mucous membranes are normal.   Eyes: Conjunctivae, EOM and lids are normal. Pupils are equal, round, and reactive to light.   Neck: " Trachea normal and phonation normal. Neck supple.   Cardiovascular: Normal rate, regular rhythm and normal heart sounds.   Pulmonary/Chest: Effort normal and breath sounds normal. No stridor. No respiratory distress.   Musculoskeletal: Normal range of motion.         General: Normal range of motion.   Neurological: He is alert and oriented to person, place, and time.   Skin: Skin is warm, dry, intact, rash and vesicular. Capillary refill takes less than 2 seconds. No abrasion, No burn, No bruising, No erythema and No ecchymosis        Psychiatric: His speech is normal and behavior is normal. Judgment and thought content normal.   Nursing note and vitals reviewed.      Assessment:     1. Herpes zoster without complication        Plan:       Herpes zoster without complication  -     valACYclovir (VALTREX) 1000 MG tablet; Take 1 tablet (1,000 mg total) by mouth 3 (three) times daily. for 7 days  Dispense: 21 tablet; Refill: 0

## 2025-01-08 ENCOUNTER — LAB VISIT (OUTPATIENT)
Dept: LAB | Facility: HOSPITAL | Age: 80
End: 2025-01-08
Attending: RADIOLOGY
Payer: MEDICARE

## 2025-01-08 DIAGNOSIS — C61 CANCER OF PROSTATE: ICD-10-CM

## 2025-01-08 LAB — COMPLEXED PSA SERPL-MCNC: 0.3 NG/ML (ref 0–4)

## 2025-01-08 PROCEDURE — 36415 COLL VENOUS BLD VENIPUNCTURE: CPT | Mod: PO | Performed by: RADIOLOGY

## 2025-01-08 PROCEDURE — 84153 ASSAY OF PSA TOTAL: CPT | Performed by: RADIOLOGY

## 2025-02-26 ENCOUNTER — OFFICE VISIT (OUTPATIENT)
Dept: RADIATION ONCOLOGY | Facility: CLINIC | Age: 80
End: 2025-02-26
Payer: MEDICARE

## 2025-02-26 VITALS
DIASTOLIC BLOOD PRESSURE: 95 MMHG | OXYGEN SATURATION: 96 % | WEIGHT: 171.75 LBS | HEART RATE: 71 BPM | TEMPERATURE: 98 F | HEIGHT: 70 IN | BODY MASS INDEX: 24.59 KG/M2 | SYSTOLIC BLOOD PRESSURE: 165 MMHG | RESPIRATION RATE: 20 BRPM

## 2025-02-26 DIAGNOSIS — C61 CANCER OF PROSTATE: Primary | ICD-10-CM

## 2025-02-26 PROCEDURE — 1101F PT FALLS ASSESS-DOCD LE1/YR: CPT | Mod: CPTII,S$GLB,, | Performed by: RADIOLOGY

## 2025-02-26 PROCEDURE — 99999 PR PBB SHADOW E&M-EST. PATIENT-LVL III: CPT | Mod: PBBFAC,,, | Performed by: RADIOLOGY

## 2025-02-26 PROCEDURE — 3080F DIAST BP >= 90 MM HG: CPT | Mod: CPTII,S$GLB,, | Performed by: RADIOLOGY

## 2025-02-26 PROCEDURE — 1159F MED LIST DOCD IN RCRD: CPT | Mod: CPTII,S$GLB,, | Performed by: RADIOLOGY

## 2025-02-26 PROCEDURE — 99212 OFFICE O/P EST SF 10 MIN: CPT | Mod: S$GLB,,, | Performed by: RADIOLOGY

## 2025-02-26 PROCEDURE — 3288F FALL RISK ASSESSMENT DOCD: CPT | Mod: CPTII,S$GLB,, | Performed by: RADIOLOGY

## 2025-02-26 PROCEDURE — 1125F AMNT PAIN NOTED PAIN PRSNT: CPT | Mod: CPTII,S$GLB,, | Performed by: RADIOLOGY

## 2025-02-26 PROCEDURE — 3077F SYST BP >= 140 MM HG: CPT | Mod: CPTII,S$GLB,, | Performed by: RADIOLOGY

## 2025-02-26 PROCEDURE — 1160F RVW MEDS BY RX/DR IN RCRD: CPT | Mod: CPTII,S$GLB,, | Performed by: RADIOLOGY

## 2025-02-27 ENCOUNTER — PATIENT MESSAGE (OUTPATIENT)
Dept: RADIATION ONCOLOGY | Facility: CLINIC | Age: 80
End: 2025-02-27
Payer: MEDICARE

## 2025-02-27 NOTE — PROGRESS NOTES
[facility] - Radiation Oncology     Patient ID: Erasmo Fitch is a 79 y.o. male.    Chief Complaint: Follow-up      Mr. Fitch has a history of Stage I (cT1c, cN0, cM0, PSA: 5.3, Grade Group: 1) adenocarcinoma of the prostate.  He presented in May of 2020 with a PSA of 5.3 ng/ml.   Biopsies from the Lt. apex, Lt. mid gland, Lt. base and the hypoechoic area at the Rt. base revealed Tram 6 (3+3) adenocarcinoma.  MRI revealed a 2 cm PI-RADS % lesion at the Lt. base/mid gland with no extraprostatic extension. There 9 mm PI-RADS 4 lesion at the Rt. apex with no extraprostatic extension.   He elected to proceed with prostate brachytherapy on 12/21/20.  He has remained RONNIE since that time.       Review of Systems  Constitutional:  Positive for fatigue. Negative for activity change, appetite change and chills.   Gastrointestinal:  Negative for abdominal pain, change in bowel habit, constipation and diarrhea.   Genitourinary:  Positive for difficulty urinating (notes slow stream, nocturia once per night.  Unable to tolerate Flomax) and erectile dysfunction. Negative for bladder incontinence, dysuria, flank pain and hematuria.        Physical Exam  Constitutional:       General: He is not in acute distress.     Appearance: Normal appearance.   Neurological:      Mental Status: He is alert and oriented to person, place, and time.   Psychiatric:         Mood and Affect: Mood normal.         Judgment: Judgment normal.        Latest Reference Range & Units 12/28/22 10:32 01/18/24 11:36 01/08/25 13:59   PSA Diagnostic 0.00 - 4.00 ng/mL 0.14 0.10 0.30          Assessment and Plan   1 Prostate cancer - doing well no evidence of tumor progression now 5 years status post brachytherapy.  Plan PSA at 6 - 12 months with phone review.

## 2025-05-22 ENCOUNTER — TELEPHONE (OUTPATIENT)
Dept: PULMONOLOGY | Facility: CLINIC | Age: 80
End: 2025-05-22
Payer: MEDICARE

## 2025-05-22 DIAGNOSIS — R05.9 COUGH, UNSPECIFIED TYPE: Primary | ICD-10-CM

## 2025-05-22 NOTE — TELEPHONE ENCOUNTER
I spoke with patient wife (Ms. Ruiz) in regards to scheduling his appointment. Patient is scheduled on 6/5/25 at 9:30 am with Dr. Moe. She confirmed and verbalized understanding.

## 2025-05-22 NOTE — TELEPHONE ENCOUNTER
----- Message from Aneta sent at 5/22/2025  1:01 PM CDT -----  Type:  Sooner Appointment RequestCaller is requesting a sooner appointment.  Name of Caller:Mr Fitch When is the first available appointment?none Symptoms:persistence cough for 2 weeks Would the patient rather a call back or a response via [facility]? Call Best Call Back Number: [phone] Information: please call

## 2025-06-05 ENCOUNTER — HOSPITAL ENCOUNTER (OUTPATIENT)
Dept: PULMONOLOGY | Facility: CLINIC | Age: 80
Discharge: HOME | End: 2025-06-05
Payer: MEDICARE

## 2025-06-05 ENCOUNTER — OFFICE VISIT (OUTPATIENT)
Dept: PULMONOLOGY | Facility: CLINIC | Age: 80
End: 2025-06-05
Payer: MEDICARE

## 2025-06-05 VITALS
SYSTOLIC BLOOD PRESSURE: 124 MMHG | DIASTOLIC BLOOD PRESSURE: 70 MMHG | HEART RATE: 61 BPM | OXYGEN SATURATION: 97 % | WEIGHT: 166 LBS | HEIGHT: 70 IN | BODY MASS INDEX: 23.77 KG/M2

## 2025-06-05 DIAGNOSIS — R05.2 SUBACUTE COUGH: ICD-10-CM

## 2025-06-05 DIAGNOSIS — R05.9 COUGH, UNSPECIFIED TYPE: ICD-10-CM

## 2025-06-05 DIAGNOSIS — R06.02 SHORTNESS OF BREATH: Primary | ICD-10-CM

## 2025-06-05 DIAGNOSIS — R05.9 COUGH, UNSPECIFIED TYPE: Primary | ICD-10-CM

## 2025-06-05 LAB
DLCO SINGLE BREATH LLN: 16.4
DLCO SINGLE BREATH PRE REF: 53.8 %
DLCO SINGLE BREATH REF: 23.33
DLCOC SBVA LLN: 2.28
DLCOC SBVA REF: 3.47
DLCOC SINGLE BREATH LLN: 16.4
DLCOC SINGLE BREATH REF: 23.33
DLCOCSBVAULN: 4.66
DLCOCSINGLEBREATHULN: 30.26
DLCOSINGLEBREATHULN: 30.26
DLCOSINGLEBREATHZSCORE: -2.56
DLCOVA LLN: 2.28
DLCOVA PRE REF: 66.9 %
DLCOVA PRE: 2.32 ML/(MIN*MMHG*L) (ref 2.28–4.66)
DLCOVA REF: 3.47
DLCOVAULN: 4.66
ERVN2 LLN: -16449.13
ERVN2 PRE REF: 165 %
ERVN2 PRE: 1.43 L (ref -16449.13–16450.87)
ERVN2 REF: 0.87
ERVN2ULN: ABNORMAL
FEF 25 75 LLN: 0.86
FEF 25 75 PRE REF: 58.1 %
FEF 25 75 REF: 2.57
FET100 CHG: 11.6 %
FEV05 LLN: 1.13
FEV05 REF: 2.27
FEV1 CHG: 5.5 %
FEV1 FVC LLN: 60
FEV1 FVC PRE REF: 92.3 %
FEV1 FVC REF: 75
FEV1 LLN: 1.82
FEV1 PRE REF: 98.4 %
FEV1 REF: 2.65
FEV1 VOL CHG: 0.14
FRCN2 LLN: 2.69
FRCN2 PRE REF: 90.3 %
FRCN2 REF: 3.68
FRCN2ULN: 4.67
FVC CHG: 2.5 %
FVC LLN: 2.56
FVC PRE REF: 105.6 %
FVC REF: 3.57
FVC VOL CHG: 0.09
ICN2REF: 2.75
IVC PRE: 3.74 L (ref 2.56–4.6)
IVC SINGLE BREATH LLN: 2.56
IVC SINGLE BREATH PRE REF: 104.7 %
IVC SINGLE BREATH REF: 3.57
IVCSINGLEBREATHULN: 4.6
LLN IC N2: -9999997.25
PEF LLN: 4.48
PEF PRE REF: 110.2 %
PEF REF: 6.66
PHYSICIAN COMMENT: ABNORMAL
POST FEF 25 75: 1.75 L/S (ref 0.86–4.28)
POST FET 100: 6.72 SEC
POST FEV1 FVC: 71.09 % (ref 59.7–88.49)
POST FEV1: 2.75 L (ref 1.82–3.41)
POST FEV5: 2.12 L (ref 1.13–3.4)
POST FVC: 3.87 L (ref 2.56–4.6)
POST PEF: 7.29 L/S (ref 4.48–8.85)
PRE DLCO: 12.55 ML/(MIN*MMHG) (ref 16.4–30.26)
PRE FEF 25 75: 1.49 L/S (ref 0.86–4.28)
PRE FET 100: 6.02 SEC
PRE FEV05 REF: 91.2 %
PRE FEV1 FVC: 69.02 % (ref 59.7–88.49)
PRE FEV1: 2.6 L (ref 1.82–3.41)
PRE FEV5: 2.07 L (ref 1.13–3.4)
PRE FRC N2: 3.32 L (ref 2.69–4.67)
PRE FVC: 3.77 L (ref 2.56–4.6)
PRE IC N2: 2.47 L (ref -9999997.25–#######.####)
PRE PEF: 7.34 L/S (ref 4.48–8.85)
PRE REF IC N2: 89.6 %
RVN2 LLN: 2.14
RVN2 PRE REF: 67.3 %
RVN2 PRE: 1.9 L (ref 2.14–3.49)
RVN2 REF: 2.81
RVN2TLCN2 LLN: 36.57
RVN2TLCN2 PRE REF: 71.9 %
RVN2TLCN2 PRE: 32.74 % (ref 36.57–54.53)
RVN2TLCN2 REF: 45.55
RVN2TLCN2ULN: 54.53
RVN2ULN: 3.49
TLCN2 LLN: 5.57
TLCN2 PRE REF: 86.1 %
TLCN2 PRE: 5.79 L (ref 5.57–7.87)
TLCN2 REF: 6.72
TLCN2ULN: 7.87
ULN IC N2: ABNORMAL
VA PRE: 5.41 L (ref 6.57–6.57)
VA SINGLE BREATH LLN: 6.57
VA SINGLE BREATH PRE REF: 82.3 %
VA SINGLE BREATH REF: 6.57
VASINGLEBREATHULN: 6.57
VCMAXN2 LLN: 2.56
VCMAXN2 PRE REF: 109 %
VCMAXN2 PRE: 3.89 L (ref 2.56–4.6)
VCMAXN2 REF: 3.57
VCMAXN2ULN: 4.6

## 2025-06-05 PROCEDURE — 99999 PR PBB SHADOW E&M-EST. PATIENT-LVL III: CPT | Mod: PBBFAC,,, | Performed by: STUDENT IN AN ORGANIZED HEALTH CARE EDUCATION/TRAINING PROGRAM

## 2025-06-05 RX ORDER — ASPIRIN 81 MG/1
TABLET ORAL
COMMUNITY

## 2025-06-05 RX ORDER — DICLOFENAC SODIUM 50 MG/1
50 TABLET, DELAYED RELEASE ORAL 2 TIMES DAILY
COMMUNITY

## 2025-06-05 RX ORDER — AZELASTINE 1 MG/ML
2 SPRAY, METERED NASAL 2 TIMES DAILY
Qty: 30 ML | Refills: 11 | Status: SHIPPED | OUTPATIENT
Start: 2025-06-05 | End: 2026-06-05

## 2025-06-05 RX ORDER — FLUTICASONE PROPIONATE 50 MCG
2 SPRAY, SUSPENSION (ML) NASAL 2 TIMES DAILY
Qty: 16 G | Refills: 10 | Status: SHIPPED | OUTPATIENT
Start: 2025-06-05

## 2025-06-05 RX ORDER — CYCLOBENZAPRINE HCL 5 MG
5 TABLET ORAL NIGHTLY
COMMUNITY
Start: 2025-05-02

## 2025-06-05 RX ORDER — DICLOFENAC SODIUM 75 MG/1
75 TABLET, DELAYED RELEASE ORAL
COMMUNITY

## 2025-06-05 RX ORDER — AMLODIPINE BESYLATE 5 MG/1
5 TABLET ORAL
COMMUNITY

## 2025-06-05 RX ORDER — METOPROLOL SUCCINATE 25 MG/1
25 TABLET, EXTENDED RELEASE ORAL 2 TIMES DAILY
COMMUNITY

## 2025-06-05 RX ORDER — GABAPENTIN 100 MG/1
CAPSULE ORAL
COMMUNITY

## 2025-06-05 RX ORDER — LEVOCETIRIZINE DIHYDROCHLORIDE 5 MG/1
5 TABLET, FILM COATED ORAL NIGHTLY
Qty: 90 TABLET | Refills: 3 | Status: SHIPPED | OUTPATIENT
Start: 2025-06-05 | End: 2026-06-05

## 2025-06-06 ENCOUNTER — HOSPITAL ENCOUNTER (OUTPATIENT)
Dept: RADIOLOGY | Facility: HOSPITAL | Age: 80
Discharge: HOME | End: 2025-06-06
Attending: STUDENT IN AN ORGANIZED HEALTH CARE EDUCATION/TRAINING PROGRAM
Payer: MEDICARE

## 2025-06-06 DIAGNOSIS — R05.9 COUGH, UNSPECIFIED TYPE: ICD-10-CM

## 2025-06-06 PROCEDURE — 71250 CT THORAX DX C-: CPT | Mod: TC

## 2025-06-06 PROCEDURE — 71250 CT THORAX DX C-: CPT | Mod: 26,,, | Performed by: STUDENT IN AN ORGANIZED HEALTH CARE EDUCATION/TRAINING PROGRAM

## 2025-06-09 ENCOUNTER — RESULTS FOLLOW-UP (OUTPATIENT)
Dept: PULMONOLOGY | Facility: CLINIC | Age: 80
End: 2025-06-09

## 2025-06-12 ENCOUNTER — PATIENT MESSAGE (OUTPATIENT)
Dept: CRITICAL CARE MEDICINE | Facility: HOSPITAL | Age: 80
End: 2025-06-12
Payer: MEDICARE

## 2025-06-12 DIAGNOSIS — R05.2 SUBACUTE COUGH: Primary | ICD-10-CM

## 2025-08-27 ENCOUNTER — LAB VISIT (OUTPATIENT)
Dept: LAB | Facility: HOSPITAL | Age: 80
End: 2025-08-27
Attending: RADIOLOGY
Payer: MEDICARE

## 2025-08-27 DIAGNOSIS — C61 CANCER OF PROSTATE: ICD-10-CM

## 2025-08-27 LAB — PSA SERPL-MCNC: 0.97 NG/ML

## 2025-08-27 PROCEDURE — 84153 ASSAY OF PSA TOTAL: CPT

## 2025-08-27 PROCEDURE — 36415 COLL VENOUS BLD VENIPUNCTURE: CPT | Mod: PO

## 2025-08-28 ENCOUNTER — PATIENT MESSAGE (OUTPATIENT)
Dept: RADIATION ONCOLOGY | Facility: CLINIC | Age: 80
End: 2025-08-28
Payer: MEDICARE

## (undated) DEVICE — UNDERGLOVES BIOGEL PI SIZE 7.5

## (undated) DEVICE — UNDERGLOVES BIOGEL PI SZ 7 LF

## (undated) DEVICE — PACK CYSTO

## (undated) DEVICE — GOWN SURGICAL X-LARGE

## (undated) DEVICE — Device

## (undated) DEVICE — TRAY CYSTO BASIN